# Patient Record
Sex: MALE | Race: WHITE | NOT HISPANIC OR LATINO | Employment: OTHER | URBAN - METROPOLITAN AREA
[De-identification: names, ages, dates, MRNs, and addresses within clinical notes are randomized per-mention and may not be internally consistent; named-entity substitution may affect disease eponyms.]

---

## 2017-01-23 ENCOUNTER — OFFICE VISIT - HEALTHEAST (OUTPATIENT)
Dept: INTERNAL MEDICINE | Facility: CLINIC | Age: 77
End: 2017-01-23

## 2017-01-23 DIAGNOSIS — E66.3 OVERWEIGHT (BMI 25.0-29.9): ICD-10-CM

## 2017-01-23 DIAGNOSIS — E78.00 HYPERCHOLESTEREMIA: ICD-10-CM

## 2017-01-23 DIAGNOSIS — N52.9 ERECTILE DYSFUNCTION: ICD-10-CM

## 2017-01-23 DIAGNOSIS — I10 ESSENTIAL HYPERTENSION WITH GOAL BLOOD PRESSURE LESS THAN 130/80: ICD-10-CM

## 2017-01-24 ENCOUNTER — COMMUNICATION - HEALTHEAST (OUTPATIENT)
Dept: LAB | Facility: CLINIC | Age: 77
End: 2017-01-24

## 2017-01-24 ENCOUNTER — AMBULATORY - HEALTHEAST (OUTPATIENT)
Dept: INTERNAL MEDICINE | Facility: CLINIC | Age: 77
End: 2017-01-24

## 2017-01-24 DIAGNOSIS — N52.9 ED (ERECTILE DYSFUNCTION): ICD-10-CM

## 2017-01-25 ENCOUNTER — AMBULATORY - HEALTHEAST (OUTPATIENT)
Dept: LAB | Facility: CLINIC | Age: 77
End: 2017-01-25

## 2017-01-25 DIAGNOSIS — N52.9 ED (ERECTILE DYSFUNCTION): ICD-10-CM

## 2017-01-29 ENCOUNTER — COMMUNICATION - HEALTHEAST (OUTPATIENT)
Dept: INTERNAL MEDICINE | Facility: CLINIC | Age: 77
End: 2017-01-29

## 2017-02-24 ENCOUNTER — AMBULATORY - HEALTHEAST (OUTPATIENT)
Dept: INTERNAL MEDICINE | Facility: CLINIC | Age: 77
End: 2017-02-24

## 2017-02-24 ENCOUNTER — COMMUNICATION - HEALTHEAST (OUTPATIENT)
Dept: INTERNAL MEDICINE | Facility: CLINIC | Age: 77
End: 2017-02-24

## 2017-05-31 ENCOUNTER — COMMUNICATION - HEALTHEAST (OUTPATIENT)
Dept: INTERNAL MEDICINE | Facility: CLINIC | Age: 77
End: 2017-05-31

## 2017-05-31 DIAGNOSIS — I10 BP (HIGH BLOOD PRESSURE): ICD-10-CM

## 2017-08-29 ENCOUNTER — COMMUNICATION - HEALTHEAST (OUTPATIENT)
Dept: INTERNAL MEDICINE | Facility: CLINIC | Age: 77
End: 2017-08-29

## 2017-08-29 DIAGNOSIS — I10 HYPERTENSION: ICD-10-CM

## 2017-10-18 ENCOUNTER — OFFICE VISIT - HEALTHEAST (OUTPATIENT)
Dept: INTERNAL MEDICINE | Facility: CLINIC | Age: 77
End: 2017-10-18

## 2017-10-18 DIAGNOSIS — E78.5 HYPERLIPIDEMIA: ICD-10-CM

## 2017-10-18 DIAGNOSIS — N52.9 ED (ERECTILE DYSFUNCTION): ICD-10-CM

## 2017-10-18 DIAGNOSIS — Z23 ENCOUNTER FOR VACCINATION: ICD-10-CM

## 2017-10-18 DIAGNOSIS — I10 ESSENTIAL HYPERTENSION: ICD-10-CM

## 2017-10-18 DIAGNOSIS — Z76.89 ENCOUNTER TO ESTABLISH CARE: ICD-10-CM

## 2017-10-18 LAB
CHOLEST SERPL-MCNC: 153 MG/DL
FASTING STATUS PATIENT QL REPORTED: YES
HDLC SERPL-MCNC: 50 MG/DL
LDLC SERPL CALC-MCNC: 80 MG/DL
TRIGL SERPL-MCNC: 114 MG/DL

## 2017-10-18 ASSESSMENT — MIFFLIN-ST. JEOR: SCORE: 1461.98

## 2017-10-20 ENCOUNTER — COMMUNICATION - HEALTHEAST (OUTPATIENT)
Dept: INTERNAL MEDICINE | Facility: CLINIC | Age: 77
End: 2017-10-20

## 2017-11-15 ENCOUNTER — COMMUNICATION - HEALTHEAST (OUTPATIENT)
Dept: INTERNAL MEDICINE | Facility: CLINIC | Age: 77
End: 2017-11-15

## 2017-11-15 DIAGNOSIS — I10 BP (HIGH BLOOD PRESSURE): ICD-10-CM

## 2018-01-10 ENCOUNTER — COMMUNICATION - HEALTHEAST (OUTPATIENT)
Dept: FAMILY MEDICINE | Facility: CLINIC | Age: 78
End: 2018-01-10

## 2018-01-10 ENCOUNTER — AMBULATORY - HEALTHEAST (OUTPATIENT)
Dept: INTERNAL MEDICINE | Facility: CLINIC | Age: 78
End: 2018-01-10

## 2018-05-17 ENCOUNTER — COMMUNICATION - HEALTHEAST (OUTPATIENT)
Dept: FAMILY MEDICINE | Facility: CLINIC | Age: 78
End: 2018-05-17

## 2018-05-28 ENCOUNTER — COMMUNICATION - HEALTHEAST (OUTPATIENT)
Dept: INTERNAL MEDICINE | Facility: CLINIC | Age: 78
End: 2018-05-28

## 2018-05-28 DIAGNOSIS — I10 ESSENTIAL HYPERTENSION: ICD-10-CM

## 2018-06-20 ENCOUNTER — OFFICE VISIT - HEALTHEAST (OUTPATIENT)
Dept: FAMILY MEDICINE | Facility: CLINIC | Age: 78
End: 2018-06-20

## 2018-06-20 DIAGNOSIS — H26.9 CATARACT: ICD-10-CM

## 2018-06-20 DIAGNOSIS — Z01.818 PRE-OP EVALUATION: ICD-10-CM

## 2018-06-20 DIAGNOSIS — I10 ESSENTIAL HYPERTENSION: ICD-10-CM

## 2018-06-20 LAB
ANION GAP SERPL CALCULATED.3IONS-SCNC: 8 MMOL/L (ref 5–18)
ATRIAL RATE - MUSE: 50 BPM
BASOPHILS # BLD AUTO: 0 THOU/UL (ref 0–0.2)
BASOPHILS NFR BLD AUTO: 0 % (ref 0–2)
BUN SERPL-MCNC: 12 MG/DL (ref 8–28)
CALCIUM SERPL-MCNC: 9.8 MG/DL (ref 8.5–10.5)
CHLORIDE BLD-SCNC: 103 MMOL/L (ref 98–107)
CO2 SERPL-SCNC: 27 MMOL/L (ref 22–31)
CREAT SERPL-MCNC: 0.86 MG/DL (ref 0.7–1.3)
DIASTOLIC BLOOD PRESSURE - MUSE: NORMAL MMHG
EOSINOPHIL # BLD AUTO: 0.1 THOU/UL (ref 0–0.4)
EOSINOPHIL NFR BLD AUTO: 1 % (ref 0–6)
ERYTHROCYTE [DISTWIDTH] IN BLOOD BY AUTOMATED COUNT: 10.7 % (ref 11–14.5)
GFR SERPL CREATININE-BSD FRML MDRD: >60 ML/MIN/1.73M2
GLUCOSE BLD-MCNC: 94 MG/DL (ref 70–125)
HCT VFR BLD AUTO: 45.5 % (ref 40–54)
HGB BLD-MCNC: 15.4 G/DL (ref 14–18)
INTERPRETATION ECG - MUSE: NORMAL
LYMPHOCYTES # BLD AUTO: 1.8 THOU/UL (ref 0.8–4.4)
LYMPHOCYTES NFR BLD AUTO: 31 % (ref 20–40)
MCH RBC QN AUTO: 30.9 PG (ref 27–34)
MCHC RBC AUTO-ENTMCNC: 33.8 G/DL (ref 32–36)
MCV RBC AUTO: 91 FL (ref 80–100)
MONOCYTES # BLD AUTO: 0.6 THOU/UL (ref 0–0.9)
MONOCYTES NFR BLD AUTO: 10 % (ref 2–10)
NEUTROPHILS # BLD AUTO: 3.3 THOU/UL (ref 2–7.7)
NEUTROPHILS NFR BLD AUTO: 58 % (ref 50–70)
P AXIS - MUSE: 18 DEGREES
PLATELET # BLD AUTO: 219 THOU/UL (ref 140–440)
PMV BLD AUTO: 6.9 FL (ref 7–10)
POTASSIUM BLD-SCNC: 4.5 MMOL/L (ref 3.5–5)
PR INTERVAL - MUSE: 164 MS
QRS DURATION - MUSE: 102 MS
QT - MUSE: 450 MS
QTC - MUSE: 410 MS
R AXIS - MUSE: 20 DEGREES
RBC # BLD AUTO: 4.98 MILL/UL (ref 4.4–6.2)
SODIUM SERPL-SCNC: 138 MMOL/L (ref 136–145)
SYSTOLIC BLOOD PRESSURE - MUSE: NORMAL MMHG
T AXIS - MUSE: 29 DEGREES
VENTRICULAR RATE- MUSE: 50 BPM
WBC: 5.7 THOU/UL (ref 4–11)

## 2018-06-21 ENCOUNTER — COMMUNICATION - HEALTHEAST (OUTPATIENT)
Dept: INTERNAL MEDICINE | Facility: CLINIC | Age: 78
End: 2018-06-21

## 2018-08-28 ENCOUNTER — COMMUNICATION - HEALTHEAST (OUTPATIENT)
Dept: INTERNAL MEDICINE | Facility: CLINIC | Age: 78
End: 2018-08-28

## 2018-08-28 DIAGNOSIS — I10 ESSENTIAL HYPERTENSION: ICD-10-CM

## 2018-10-07 ENCOUNTER — COMMUNICATION - HEALTHEAST (OUTPATIENT)
Dept: INTERNAL MEDICINE | Facility: CLINIC | Age: 78
End: 2018-10-07

## 2018-10-07 DIAGNOSIS — E78.5 HYPERLIPIDEMIA: ICD-10-CM

## 2018-10-10 ENCOUNTER — COMMUNICATION - HEALTHEAST (OUTPATIENT)
Dept: INTERNAL MEDICINE | Facility: CLINIC | Age: 78
End: 2018-10-10

## 2018-10-10 DIAGNOSIS — E78.5 HYPERLIPIDEMIA: ICD-10-CM

## 2018-10-23 ENCOUNTER — AMBULATORY - HEALTHEAST (OUTPATIENT)
Dept: NURSING | Facility: CLINIC | Age: 78
End: 2018-10-23

## 2018-10-29 ENCOUNTER — COMMUNICATION - HEALTHEAST (OUTPATIENT)
Dept: INTERNAL MEDICINE | Facility: CLINIC | Age: 78
End: 2018-10-29

## 2018-10-29 DIAGNOSIS — E78.5 HYPERLIPIDEMIA: ICD-10-CM

## 2018-11-23 ENCOUNTER — COMMUNICATION - HEALTHEAST (OUTPATIENT)
Dept: FAMILY MEDICINE | Facility: CLINIC | Age: 78
End: 2018-11-23

## 2018-11-23 DIAGNOSIS — I10 ESSENTIAL HYPERTENSION: ICD-10-CM

## 2018-11-28 ENCOUNTER — COMMUNICATION - HEALTHEAST (OUTPATIENT)
Dept: FAMILY MEDICINE | Facility: CLINIC | Age: 78
End: 2018-11-28

## 2018-11-28 DIAGNOSIS — I10 ESSENTIAL HYPERTENSION: ICD-10-CM

## 2019-03-06 ENCOUNTER — COMMUNICATION - HEALTHEAST (OUTPATIENT)
Dept: SCHEDULING | Facility: CLINIC | Age: 79
End: 2019-03-06

## 2019-03-07 ENCOUNTER — COMMUNICATION - HEALTHEAST (OUTPATIENT)
Dept: FAMILY MEDICINE | Facility: CLINIC | Age: 79
End: 2019-03-07

## 2019-03-07 DIAGNOSIS — I10 ESSENTIAL HYPERTENSION: ICD-10-CM

## 2019-03-25 ENCOUNTER — COMMUNICATION - HEALTHEAST (OUTPATIENT)
Dept: FAMILY MEDICINE | Facility: CLINIC | Age: 79
End: 2019-03-25

## 2019-03-28 ENCOUNTER — OFFICE VISIT - HEALTHEAST (OUTPATIENT)
Dept: FAMILY MEDICINE | Facility: CLINIC | Age: 79
End: 2019-03-28

## 2019-03-28 DIAGNOSIS — N52.9 ED (ERECTILE DYSFUNCTION): ICD-10-CM

## 2019-03-29 ENCOUNTER — NURSE TRIAGE (OUTPATIENT)
Dept: NURSING | Facility: CLINIC | Age: 79
End: 2019-03-29

## 2019-03-29 ENCOUNTER — COMMUNICATION - HEALTHEAST (OUTPATIENT)
Dept: FAMILY MEDICINE | Facility: CLINIC | Age: 79
End: 2019-03-29

## 2019-04-29 ENCOUNTER — RECORDS - HEALTHEAST (OUTPATIENT)
Dept: ADMINISTRATIVE | Facility: OTHER | Age: 79
End: 2019-04-29

## 2019-06-06 ENCOUNTER — COMMUNICATION - HEALTHEAST (OUTPATIENT)
Dept: FAMILY MEDICINE | Facility: CLINIC | Age: 79
End: 2019-06-06

## 2019-06-06 DIAGNOSIS — I10 ESSENTIAL HYPERTENSION: ICD-10-CM

## 2019-07-01 ENCOUNTER — OFFICE VISIT - HEALTHEAST (OUTPATIENT)
Dept: FAMILY MEDICINE | Facility: CLINIC | Age: 79
End: 2019-07-01

## 2019-07-01 DIAGNOSIS — Z00.00 ROUTINE GENERAL MEDICAL EXAMINATION AT A HEALTH CARE FACILITY: ICD-10-CM

## 2019-07-01 DIAGNOSIS — Z98.890 H/O COLONOSCOPY: ICD-10-CM

## 2019-07-01 DIAGNOSIS — I10 ESSENTIAL HYPERTENSION: ICD-10-CM

## 2019-07-01 DIAGNOSIS — E78.00 HYPERCHOLESTEREMIA: ICD-10-CM

## 2019-07-01 LAB
ALBUMIN SERPL-MCNC: 4.2 G/DL (ref 3.5–5)
ALP SERPL-CCNC: 64 U/L (ref 45–120)
ALT SERPL W P-5'-P-CCNC: 32 U/L (ref 0–45)
ANION GAP SERPL CALCULATED.3IONS-SCNC: 11 MMOL/L (ref 5–18)
AST SERPL W P-5'-P-CCNC: 26 U/L (ref 0–40)
BILIRUB SERPL-MCNC: 0.8 MG/DL (ref 0–1)
BUN SERPL-MCNC: 15 MG/DL (ref 8–28)
CALCIUM SERPL-MCNC: 9.5 MG/DL (ref 8.5–10.5)
CHLORIDE BLD-SCNC: 101 MMOL/L (ref 98–107)
CHOLEST SERPL-MCNC: 193 MG/DL
CO2 SERPL-SCNC: 26 MMOL/L (ref 22–31)
CREAT SERPL-MCNC: 0.99 MG/DL (ref 0.7–1.3)
FASTING STATUS PATIENT QL REPORTED: YES
GFR SERPL CREATININE-BSD FRML MDRD: >60 ML/MIN/1.73M2
GLUCOSE BLD-MCNC: 104 MG/DL (ref 70–125)
HDLC SERPL-MCNC: 63 MG/DL
LDLC SERPL CALC-MCNC: 87 MG/DL
POTASSIUM BLD-SCNC: 4.2 MMOL/L (ref 3.5–5)
PROT SERPL-MCNC: 7.1 G/DL (ref 6–8)
SODIUM SERPL-SCNC: 138 MMOL/L (ref 136–145)
TRIGL SERPL-MCNC: 217 MG/DL

## 2019-07-01 ASSESSMENT — MIFFLIN-ST. JEOR: SCORE: 1491.44

## 2019-07-03 ENCOUNTER — COMMUNICATION - HEALTHEAST (OUTPATIENT)
Dept: INTERNAL MEDICINE | Facility: CLINIC | Age: 79
End: 2019-07-03

## 2019-07-18 ENCOUNTER — RECORDS - HEALTHEAST (OUTPATIENT)
Dept: ADMINISTRATIVE | Facility: OTHER | Age: 79
End: 2019-07-18

## 2019-07-26 ENCOUNTER — COMMUNICATION - HEALTHEAST (OUTPATIENT)
Dept: FAMILY MEDICINE | Facility: CLINIC | Age: 79
End: 2019-07-26

## 2019-07-26 DIAGNOSIS — I10 ESSENTIAL HYPERTENSION: ICD-10-CM

## 2019-07-26 DIAGNOSIS — E78.5 HYPERLIPIDEMIA: ICD-10-CM

## 2019-08-07 ENCOUNTER — AMBULATORY - HEALTHEAST (OUTPATIENT)
Dept: NURSING | Facility: CLINIC | Age: 79
End: 2019-08-07

## 2019-08-07 DIAGNOSIS — Z00.00 ROUTINE GENERAL MEDICAL EXAMINATION AT A HEALTH CARE FACILITY: ICD-10-CM

## 2019-09-02 ENCOUNTER — COMMUNICATION - HEALTHEAST (OUTPATIENT)
Dept: FAMILY MEDICINE | Facility: CLINIC | Age: 79
End: 2019-09-02

## 2019-09-02 DIAGNOSIS — I10 ESSENTIAL HYPERTENSION: ICD-10-CM

## 2019-09-04 ENCOUNTER — RECORDS - HEALTHEAST (OUTPATIENT)
Dept: ADMINISTRATIVE | Facility: OTHER | Age: 79
End: 2019-09-04

## 2019-09-06 ENCOUNTER — COMMUNICATION - HEALTHEAST (OUTPATIENT)
Dept: FAMILY MEDICINE | Facility: CLINIC | Age: 79
End: 2019-09-06

## 2019-09-06 DIAGNOSIS — I10 ESSENTIAL HYPERTENSION: ICD-10-CM

## 2020-02-26 ENCOUNTER — COMMUNICATION - HEALTHEAST (OUTPATIENT)
Dept: FAMILY MEDICINE | Facility: CLINIC | Age: 80
End: 2020-02-26

## 2020-02-26 DIAGNOSIS — I10 ESSENTIAL HYPERTENSION: ICD-10-CM

## 2020-07-21 ENCOUNTER — COMMUNICATION - HEALTHEAST (OUTPATIENT)
Dept: FAMILY MEDICINE | Facility: CLINIC | Age: 80
End: 2020-07-21

## 2020-07-21 DIAGNOSIS — E78.5 HYPERLIPIDEMIA: ICD-10-CM

## 2020-08-04 ENCOUNTER — COMMUNICATION - HEALTHEAST (OUTPATIENT)
Dept: SCHEDULING | Facility: CLINIC | Age: 80
End: 2020-08-04

## 2020-08-04 DIAGNOSIS — E78.5 HYPERLIPIDEMIA: ICD-10-CM

## 2020-08-05 ENCOUNTER — RECORDS - HEALTHEAST (OUTPATIENT)
Dept: ADMINISTRATIVE | Facility: OTHER | Age: 80
End: 2020-08-05

## 2020-08-07 ENCOUNTER — OFFICE VISIT - HEALTHEAST (OUTPATIENT)
Dept: FAMILY MEDICINE | Facility: CLINIC | Age: 80
End: 2020-08-07

## 2020-08-07 DIAGNOSIS — E55.9 VITAMIN D DEFICIENCY: ICD-10-CM

## 2020-08-07 DIAGNOSIS — N52.9 ERECTILE DYSFUNCTION, UNSPECIFIED ERECTILE DYSFUNCTION TYPE: ICD-10-CM

## 2020-08-07 DIAGNOSIS — Z00.00 ROUTINE GENERAL MEDICAL EXAMINATION AT A HEALTH CARE FACILITY: ICD-10-CM

## 2020-08-07 DIAGNOSIS — E78.00 HYPERCHOLESTEREMIA: ICD-10-CM

## 2020-08-07 DIAGNOSIS — I10 ESSENTIAL HYPERTENSION: ICD-10-CM

## 2020-08-07 LAB
ALBUMIN SERPL-MCNC: 4.1 G/DL (ref 3.5–5)
ALP SERPL-CCNC: 55 U/L (ref 45–120)
ALT SERPL W P-5'-P-CCNC: 26 U/L (ref 0–45)
ANION GAP SERPL CALCULATED.3IONS-SCNC: 11 MMOL/L (ref 5–18)
AST SERPL W P-5'-P-CCNC: 21 U/L (ref 0–40)
BILIRUB SERPL-MCNC: 0.6 MG/DL (ref 0–1)
BUN SERPL-MCNC: 13 MG/DL (ref 8–28)
CALCIUM SERPL-MCNC: 9.5 MG/DL (ref 8.5–10.5)
CHLORIDE BLD-SCNC: 102 MMOL/L (ref 98–107)
CHOLEST SERPL-MCNC: 176 MG/DL
CO2 SERPL-SCNC: 25 MMOL/L (ref 22–31)
CREAT SERPL-MCNC: 0.93 MG/DL (ref 0.7–1.3)
FASTING STATUS PATIENT QL REPORTED: YES
GFR SERPL CREATININE-BSD FRML MDRD: >60 ML/MIN/1.73M2
GLUCOSE BLD-MCNC: 95 MG/DL (ref 70–125)
HDLC SERPL-MCNC: 60 MG/DL
LDLC SERPL CALC-MCNC: 93 MG/DL
POTASSIUM BLD-SCNC: 4.8 MMOL/L (ref 3.5–5)
PROT SERPL-MCNC: 6.8 G/DL (ref 6–8)
SODIUM SERPL-SCNC: 138 MMOL/L (ref 136–145)
TRIGL SERPL-MCNC: 117 MG/DL

## 2020-08-07 ASSESSMENT — MIFFLIN-ST. JEOR: SCORE: 1455.86

## 2020-08-10 LAB
25(OH)D3 SERPL-MCNC: 33.4 NG/ML (ref 30–80)
25(OH)D3 SERPL-MCNC: 33.4 NG/ML (ref 30–80)

## 2020-08-11 ENCOUNTER — COMMUNICATION - HEALTHEAST (OUTPATIENT)
Dept: FAMILY MEDICINE | Facility: CLINIC | Age: 80
End: 2020-08-11

## 2020-08-13 ENCOUNTER — COMMUNICATION - HEALTHEAST (OUTPATIENT)
Dept: FAMILY MEDICINE | Facility: CLINIC | Age: 80
End: 2020-08-13

## 2020-08-13 DIAGNOSIS — I10 ESSENTIAL HYPERTENSION: ICD-10-CM

## 2020-08-13 RX ORDER — AMLODIPINE BESYLATE 5 MG/1
5 TABLET ORAL DAILY
Qty: 90 TABLET | Refills: 3 | Status: SHIPPED | OUTPATIENT
Start: 2020-08-13 | End: 2021-08-22

## 2020-08-17 ENCOUNTER — COMMUNICATION - HEALTHEAST (OUTPATIENT)
Dept: FAMILY MEDICINE | Facility: CLINIC | Age: 80
End: 2020-08-17

## 2020-08-17 DIAGNOSIS — N52.9 ED (ERECTILE DYSFUNCTION): ICD-10-CM

## 2020-08-18 RX ORDER — SILDENAFIL CITRATE 20 MG/1
60 TABLET ORAL DAILY PRN
Qty: 50 TABLET | Refills: 5 | Status: SHIPPED | OUTPATIENT
Start: 2020-08-18 | End: 2021-08-23

## 2020-10-23 ENCOUNTER — COMMUNICATION - HEALTHEAST (OUTPATIENT)
Dept: FAMILY MEDICINE | Facility: CLINIC | Age: 80
End: 2020-10-23

## 2020-10-23 DIAGNOSIS — E78.5 HYPERLIPIDEMIA: ICD-10-CM

## 2020-10-25 RX ORDER — ATORVASTATIN CALCIUM 20 MG/1
20 TABLET, FILM COATED ORAL DAILY
Qty: 90 TABLET | Refills: 2 | Status: SHIPPED | OUTPATIENT
Start: 2020-10-25 | End: 2021-08-23

## 2020-12-01 ENCOUNTER — COMMUNICATION - HEALTHEAST (OUTPATIENT)
Dept: FAMILY MEDICINE | Facility: CLINIC | Age: 80
End: 2020-12-01

## 2020-12-01 DIAGNOSIS — I10 ESSENTIAL HYPERTENSION: ICD-10-CM

## 2020-12-01 RX ORDER — LISINOPRIL/HYDROCHLOROTHIAZIDE 10-12.5 MG
1 TABLET ORAL DAILY
Qty: 90 TABLET | Refills: 2 | Status: SHIPPED | OUTPATIENT
Start: 2020-12-01 | End: 2021-08-23

## 2020-12-11 ENCOUNTER — COMMUNICATION - HEALTHEAST (OUTPATIENT)
Dept: INTERNAL MEDICINE | Facility: CLINIC | Age: 80
End: 2020-12-11

## 2020-12-11 DIAGNOSIS — I10 ESSENTIAL HYPERTENSION: ICD-10-CM

## 2021-05-03 ENCOUNTER — OFFICE VISIT - HEALTHEAST (OUTPATIENT)
Dept: FAMILY MEDICINE | Facility: CLINIC | Age: 81
End: 2021-05-03

## 2021-05-03 DIAGNOSIS — R39.14 BENIGN PROSTATIC HYPERPLASIA WITH INCOMPLETE BLADDER EMPTYING: ICD-10-CM

## 2021-05-03 DIAGNOSIS — E78.00 HYPERCHOLESTEREMIA: ICD-10-CM

## 2021-05-03 DIAGNOSIS — I10 ESSENTIAL HYPERTENSION: ICD-10-CM

## 2021-05-03 DIAGNOSIS — Z76.89 ENCOUNTER TO ESTABLISH CARE: ICD-10-CM

## 2021-05-03 DIAGNOSIS — N52.9 ERECTILE DYSFUNCTION, UNSPECIFIED ERECTILE DYSFUNCTION TYPE: ICD-10-CM

## 2021-05-03 DIAGNOSIS — N40.1 BENIGN PROSTATIC HYPERPLASIA WITH INCOMPLETE BLADDER EMPTYING: ICD-10-CM

## 2021-05-03 RX ORDER — TAMSULOSIN HYDROCHLORIDE 0.4 MG/1
0.4 CAPSULE ORAL
Qty: 90 CAPSULE | Refills: 3 | Status: SHIPPED | OUTPATIENT
Start: 2021-05-03 | End: 2021-09-17 | Stop reason: SINTOL

## 2021-05-03 ASSESSMENT — MIFFLIN-ST. JEOR: SCORE: 1462.09

## 2021-05-03 NOTE — ASSESSMENT & PLAN NOTE
Discussed the idea of possibly coming off of her atorvastatin.  Patient has been on for primary prevention and not secondary prevention.  Is tolerating the medication well without side effects and thus decided to stay on medication at this time.

## 2021-05-03 NOTE — ASSESSMENT & PLAN NOTE
Will start tamsulosin at 0.4 mg daily.  Expect improvement of either nocturia, bladder emptying, or erections by the end of 1 month ofuse.  If not improved then will send to urology for further evaluation and treatment.

## 2021-05-03 NOTE — ASSESSMENT & PLAN NOTE
Well-controlled with occasional symptoms of hypotension.  Additionally with start of Flomax will stop amlodipine.  If further blood pressure control needed in future will look at adjusting lisinopril or hydrochlorothiazide.

## 2021-05-03 NOTE — ASSESSMENT & PLAN NOTE
Previously responded well to sildenafil 100 mg as needed.  Given the decrease in effectiveness over the past year as well as the BPH symptoms most likely reduced efficacy from BPH and thus will treat BPH before adjusting sildenafil.

## 2021-05-27 VITALS
TEMPERATURE: 98.1 F | RESPIRATION RATE: 12 BRPM | HEART RATE: 60 BPM | WEIGHT: 178.2 LBS | DIASTOLIC BLOOD PRESSURE: 64 MMHG | SYSTOLIC BLOOD PRESSURE: 130 MMHG | BODY MASS INDEX: 28.64 KG/M2 | HEIGHT: 66 IN

## 2021-05-27 NOTE — TELEPHONE ENCOUNTER
Medication Question or Clarification  Who is calling: Patient  What medication are you calling about? (include dose and sig) Sildenafil 20mg  Who prescribed the medication?: Dr. Ward  What is your question/concern?: See Below  Pharmacy: [facility] in [place]  Okay to leave a detailed message?: Yes  Site CMT - Please call the pharmacy to obtain any additional needed information.    Patient states that PCP sent over a Rx for Viagra but told him to call today and request that a Rx be sent to the Pharmacy for the generic Sildenafil. Patient wants a Rx for Sildenafil 20mg and for a quantity of 50.

## 2021-05-27 NOTE — PROGRESS NOTES
Assessment/Plan:        1. ED (erectile dysfunction)  tx options were reviewed and the cost was also considered   Plan:   - sildenafil, antihypertensive, (REVATIO) 20 mg tablet; Take 1-3 tablets (20-60 mg total) by mouth daily as needed.  Dispense: 50 tablet; Refill: 3        Follow up prn       Subjective:    Patient ID:   Go Benton is a 78 y.o. male here to discuss treatment options for ED.  He has tried the Viagra before, but wondering any other options would be as or even more effective.     He has no other concerns today.        Review of Systems  A complete 5 point review of systems was obtained and is negative other than what is stated in the HPI.      The following patient's history were reviewed and updated as appropriate:   He  has a past medical history of Cataract (6/20/2018) and Hypertension..      Outpatient Encounter Medications as of 3/28/2019   Medication Sig Dispense Refill     amLODIPine (NORVASC) 5 MG tablet Take 1 tablet (5 mg total) by mouth daily. 90 tablet 1     aspirin 81 MG EC tablet Take 81 mg by mouth daily.       atorvastatin (LIPITOR) 20 MG tablet Take 1 tablet (20 mg total) by mouth daily. 90 tablet 2     cholecalciferol, vitamin D3, 1,000 unit tablet Take 1,000 Units by mouth daily.       lisinopril-hydrochlorothiazide (PRINZIDE,ZESTORETIC) 10-12.5 mg per tablet Take 1 tablet by mouth daily for blood pressure. 90 tablet 2     sildenafil (VIAGRA) 100 MG tablet Take 1 tablet (100 mg total) by mouth daily as needed for erectile dysfunction. 10 tablet 3     [DISCONTINUED] sildenafil (VIAGRA) 100 MG tablet Take 1 tablet (100 mg total) by mouth daily as needed for erectile dysfunction. 30 tablet 1     No facility-administered encounter medications on file as of 3/28/2019.          Objective:   /72 (Patient Site: Right Arm, Patient Position: Sitting, Cuff Size: Adult Regular)   Pulse 60   Wt 185 lb 3.2 oz (84 kg)   SpO2 95%   BMI 29.89 kg/m        Physical Exam  General  Appearance:    Alert, well hydrated, no distress   Throat:   mucous membranes moist, pharynx normal without lesions   Neck:   Supple, symmetrical, trachea midline, no adenopathy;     thyroid:  no enlargement/tenderness/nodules;    Lungs:     clear to auscultation, no wheezes, rales or rhonchi, symmetric air entry     Heart:    Regular rate and rhythm, S1 and S2 normal, no murmur, rub   or gallop, no edema    Skin:   Skin color, texture, turgor normal, no rashes or lesions

## 2021-05-27 NOTE — TELEPHONE ENCOUNTER
sildenafil (VIAGRA) 100 MG tablet 30 tablet 1 10/18/2017  --   Sig - Route: Take 1 tablet (100 mg total) by mouth daily as needed for erectile dysfunction. - Oral   Class: Print       Last OV 06/20/18    Nothing scheduled at this time      Sent [facility] reply - pt needs appt.

## 2021-05-29 NOTE — TELEPHONE ENCOUNTER
Refill Approved    Rx renewed per Medication Renewal Policy. Medication was last renewed on 11/26/18.    Ov: 3/28/19    Rosi Self, [facility] Triage/Med Refill 6/6/2019     Requested Prescriptions   Pending Prescriptions Disp Refills     lisinopril-hydrochlorothiazide (PRINZIDE,ZESTORETIC) 10-12.5 mg per tablet 90 tablet 2     Sig: Take 1 tablet by mouth daily. for blood pressure       Diuretics/Combination Diuretics Refill Protocol  Passed - 6/6/2019  1:03 PM        Passed - Visit with PCP or prescribing provider visit in past 12 months     Last office visit with prescriber/PCP: 3/28/2019 Matteo Ward MD OR same dept: 3/28/2019 Matteo Ward MD OR same specialty: 3/28/2019 Matteo Ward MD  Last physical: 6/20/2018 Last MTM visit: Visit date not found   Next visit within 3 mo: Visit date not found  Next physical within 3 mo: Visit date not found  Prescriber OR PCP: Matteo Ward MD  Last diagnosis associated with med order: 1. Essential hypertension  - lisinopril-hydrochlorothiazide (PRINZIDE,ZESTORETIC) 10-12.5 mg per tablet; Take 1 tablet by mouth daily. for blood pressure  Dispense: 90 tablet; Refill: 2    If protocol passes may refill for 12 months if within 3 months of last provider visit (or a total of 15 months).             Passed - Serum Potassium in past 12 months      Lab Results   Component Value Date    Potassium 4.5 06/20/2018             Passed - Serum Sodium in past 12 months      Lab Results   Component Value Date    Sodium 138 06/20/2018             Passed - Blood pressure on file in past 12 months     BP Readings from Last 1 Encounters:   03/28/19 134/72             Passed - Serum Creatinine in past 12 months      Creatinine   Date Value Ref Range Status   06/20/2018 0.86 0.70 - 1.30 mg/dL Final

## 2021-05-29 NOTE — TELEPHONE ENCOUNTER
Refill Request  Did you contact pharmacy: Yes  Medication name:   Requested Prescriptions     Pending Prescriptions Disp Refills     lisinopril-hydrochlorothiazide (PRINZIDE,ZESTORETIC) 10-12.5 mg per tablet 90 tablet 2     Sig: Take 1 tablet by mouth daily. for blood pressure     Who prescribed the medication: Matteo Ward MD   Pharmacy Name and Location: [facility] [place]  Is patient out of medication: Yes  Patient notified refills processed in 72 hours:  no  Okay to leave a detailed message: no    Patient's wife stated the Rx was transferred to [place] but they are in [place]. Caller stated she needs a new Rx sent in as patient is out.

## 2021-05-30 VITALS — BODY MASS INDEX: 29.73 KG/M2 | WEIGHT: 184.2 LBS

## 2021-05-30 NOTE — TELEPHONE ENCOUNTER
Refill Approved    Rx renewed per Medication Renewal Policy. Medication was last renewed on 10/29/18.    Salina Collins, [facility] Triage/Med Refill 7/26/2019     Requested Prescriptions   Pending Prescriptions Disp Refills     atorvastatin (LIPITOR) 20 MG tablet 90 tablet 2     Sig: Take 1 tablet (20 mg total) by mouth daily.       Statins Refill Protocol (Hmg CoA Reductase Inhibitors) Passed - 7/26/2019  8:46 AM        Passed - PCP or prescribing provider visit in past 12 months      Last office visit with prescriber/PCP: 3/28/2019 Matteo Ward MD OR same dept: 3/28/2019 Matteo Ward MD OR same specialty: 3/28/2019 Matteo Ward MD  Last physical: 7/1/2019 Last MTM visit: Visit date not found   Next visit within 3 mo: Visit date not found  Next physical within 3 mo: Visit date not found  Prescriber OR PCP: Matteo Ward MD  Last diagnosis associated with med order: 1. Hyperlipidemia  - atorvastatin (LIPITOR) 20 MG tablet; Take 1 tablet (20 mg total) by mouth daily.  Dispense: 90 tablet; Refill: 2    2. Essential hypertension    If protocol passes may refill for 12 months if within 3 months of last provider visit (or a total of 15 months).

## 2021-05-30 NOTE — PROGRESS NOTES
Assessment and Plan:     1. Routine general medical examination at a health care facility  - Pneumococcal polysaccharide vaccine 23-valent 1 yo or older, subq/IM  - Varicella Zoster, Recombinant Vaccine IM; Standing  - Td, Preservative Free (green label)    2. H/O colonoscopy- nl     3. BP - high blood pressure  Optimized   Continue current management     4. Hypercholesteremia  - Comprehensive Metabolic Panel  - Lipid Profile     The patient's current medical problems were reviewed.  No refills needed today.         The following health maintenance schedule was reviewed with the patient and provided in printed form in the after visit summary:   Health Maintenance   Topic Date Due     TD 18+ HE  06/06/1958     ADVANCE DIRECTIVES DISCUSSED WITH PATIENT  [birth year]06/1958     ZOSTER VACCINES (1 of 2) 06/06/1991     PNEUMOCOCCAL POLYSACCHARIDE VACCINE AGE 65 AND OVER  06/06/2006     FALL RISK ASSESSMENT  10/18/2018     INFLUENZA VACCINE RULE BASED (1) 08/01/2019     PNEUMOCOCCAL CONJUGATE VACCINE FOR ADULTS (PCV13 OR PREVNAR)  Completed        Subjective:   Chief Complaint: Go Benton is an 79 y.o. male here for an Annual Wellness visit.   He has no other concerns.     Review of Systems  Allergy: reviewed  General : negative  Ophthalmic : negative  ENT : negative  Respiratory : no cough, shortness of breath, or wheezing  Cardiovascular : no chest pain or dyspnea on exertion  Gastrointestinal : no abdominal pain, change in bowel habits, or black or bloody stools  Genito-Urinary :  no dysuria, trouble voiding, or hematuria  Dermatological : negative    Musculoskeletal : negative  Neurological : negative  Hematological and Lymphatic : negative  Endocrine : negative        Patient Care Team:  Matteo Ward MD as PCP - General (Family Medicine)     Patient Active Problem List   Diagnosis     Hypercholesteremia     BP - high blood pressure     Hypovitaminosis D - Vitamin D is almost certainly low due to how far  [place] you live.     Overweight (BMI 25.0-29.9)     ED (erectile dysfunction)     Cataract     Past Medical History:   Diagnosis Date     Cataract 2018     Hypertension       Past Surgical History:   Procedure Laterality Date     APPENDECTOMY        Family History   Problem Relation Age of Onset     Heart disease Mother          of a heart attack.     Diabetes Mother      Heart disease Father         Found dead - therefore likely heart.     Multiple sclerosis Sister      No Medical Problems Daughter      Cancer Sister         Primary brain tumor     No Medical Problems Sister      No Medical Problems Daughter       Social History     Socioeconomic History     Marital status:      Spouse name: Not on file     Number of children: Not on file     Years of education: Not on file     Highest education level: Not on file   Occupational History     Not on file   Social Needs     Financial resource strain: Not on file     Food insecurity:     Worry: Not on file     Inability: Not on file     Transportation needs:     Medical: Not on file     Non-medical: Not on file   Tobacco Use     Smoking status: Never Smoker     Smokeless tobacco: Never Used   Substance and Sexual Activity     Alcohol use: Yes     Alcohol/week: 8.4 oz     Types: 14 Glasses of wine per week     Drug use: No     Sexual activity: Yes     Partners: Female   Lifestyle     Physical activity:     Days per week: Not on file     Minutes per session: Not on file     Stress: Not on file   Relationships     Social connections:     Talks on phone: Not on file     Gets together: Not on file     Attends Jewish service: Not on file     Active member of club or organization: Not on file     Attends meetings of clubs or organizations: Not on file     Relationship status: Not on file     Intimate partner violence:     Fear of current or ex partner: Not on file     Emotionally abused: Not on file     Physically abused: Not on file     Forced sexual  "activity: Not on file   Other Topics Concern     Not on file   Social History Narrative     Not on file      Current Outpatient Medications   Medication Sig Dispense Refill     amLODIPine (NORVASC) 5 MG tablet Take 1 tablet (5 mg total) by mouth daily. 90 tablet 1     aspirin 81 MG EC tablet Take 81 mg by mouth daily.       atorvastatin (LIPITOR) 20 MG tablet Take 1 tablet (20 mg total) by mouth daily. 90 tablet 2     cholecalciferol, vitamin D3, 1,000 unit tablet Take 1,000 Units by mouth daily.       lisinopril-hydrochlorothiazide (PRINZIDE,ZESTORETIC) 10-12.5 mg per tablet Take 1 tablet by mouth daily. for blood pressure 90 tablet 0     sildenafil, antihypertensive, (REVATIO) 20 mg tablet Take 1-3 tablets (20-60 mg total) by mouth daily as needed. 50 tablet 3     No current facility-administered medications for this visit.       Objective:   Vital Signs:   Visit Vitals  /80 (Patient Site: Right Arm, Patient Position: Sitting)   Pulse (!) 54   Ht 5' 6.14\" (1.68 m)   Wt 183 lb 4.8 oz (83.1 kg)   SpO2 96%   BMI 29.46 kg/m         VisionScreening:  No exam data present     PHYSICAL EXAM  General Appearance:    Alert, well hydrated, no distress,    Eyes:    PERRL, conjunctiva/corneas clear,    Throat:   Lips, mucosa, and tongue normal; teeth and gums normal   Neck:   Supple, symmetrical, trachea midline, no adenopathy;        thyroid:  No enlargement/tenderness/nodules; no carotid    bruit or JVD   Lungs:     Clear to auscultation bilaterally, respirations unlabored   Heart:    Regular rate and rhythm, S1 and S2 normal, no murmur, rub   or gallop   Abdomen:     Soft, non-tender, normal bowel sounds, no rebound or guarding, no masses, no organomegaly   Extremities:   Extremities normal, atraumatic, no cyanosis or edema   Skin:   Skin color, texture, turgor normal, no rashes or lesions        No flowsheet data found.  A Mini-Cog score of 0-2 suggests the possibility of dementia, score of 3-5 suggests no " dementia    Identified Health Risks:

## 2021-05-30 NOTE — PATIENT INSTRUCTIONS - HE
Patient Education   Personalized Prevention Plan  You are due for the preventive services outlined below.  Your care team is available to assist you in scheduling these services.  If you have already completed any of these items, please share that information with your care team to update in your medical record.  Health Maintenance   Topic Date Due     TD 18+ HE  06/06/1958     ADVANCE DIRECTIVES DISCUSSED WITH PATIENT  06/06/1958     ZOSTER VACCINES (1 of 2) 06/06/1991     PNEUMOCOCCAL POLYSACCHARIDE VACCINE AGE 65 AND OVER  06/06/2006     FALL RISK ASSESSMENT  10/18/2018     INFLUENZA VACCINE RULE BASED (1) 08/01/2019     PNEUMOCOCCAL CONJUGATE VACCINE FOR ADULTS (PCV13 OR PREVNAR)  Completed         1. Routine general medical examination at a health care facility  - Pneumococcal polysaccharide vaccine 23-valent 3 yo or older, subq/IM  - Varicella Zoster, Recombinant Vaccine IM; Standing  Follow up in a month for the second shot.     - Td, Preservative Free (green label)    2. H/O colonoscopy- nl     3. BP - high blood pressure  - Lipid Profile    4. Hypercholesteremia  - Comprehensive Metabolic Panel  - Lipid Profile

## 2021-05-31 VITALS — HEIGHT: 66 IN | WEIGHT: 177.3 LBS | BODY MASS INDEX: 28.49 KG/M2

## 2021-05-31 NOTE — TELEPHONE ENCOUNTER
Refill Approved    Rx renewed per Medication Renewal Policy. Medication was last renewed on 3/7/19.    Sulma Arteaga, [facility] Triage/Med Refill 9/2/2019     Requested Prescriptions   Pending Prescriptions Disp Refills     amLODIPine (NORVASC) 5 MG tablet 90 tablet 1     Sig: Take 1 tablet (5 mg total) by mouth daily.       Calcium-Channel Blockers Protocol Passed - 9/2/2019  2:01 PM        Passed - PCP or prescribing provider visit in past 12 months or next 3 months     Last office visit with prescriber/PCP: 3/28/2019 Matteo Ward MD OR same dept: 3/28/2019 Matteo Ward MD OR same specialty: 3/28/2019 Matteo Ward MD  Last physical: 7/1/2019 Last MTM visit: Visit date not found   Next visit within 3 mo: Visit date not found  Next physical within 3 mo: Visit date not found  Prescriber OR PCP: Matteo Ward MD  Last diagnosis associated with med order: 1. Essential hypertension  - amLODIPine (NORVASC) 5 MG tablet; Take 1 tablet (5 mg total) by mouth daily.  Dispense: 90 tablet; Refill: 1    If protocol passes may refill for 12 months if within 3 months of last provider visit (or a total of 15 months).             Passed - Blood pressure filed in past 12 months     BP Readings from Last 1 Encounters:   07/01/19 110/80

## 2021-06-01 VITALS — BODY MASS INDEX: 29.02 KG/M2 | WEIGHT: 179.8 LBS

## 2021-06-01 NOTE — TELEPHONE ENCOUNTER
Refill Approved    Rx renewed per Medication Renewal Policy. Medication was last renewed on 6/6/19.    Viki Ricketts, [facility] Triage/Med Refill 9/7/2019     Requested Prescriptions   Pending Prescriptions Disp Refills     lisinopril-hydrochlorothiazide (PRINZIDE,ZESTORETIC) 10-12.5 mg per tablet 90 tablet 0     Sig: Take 1 tablet by mouth daily. for blood pressure       Diuretics/Combination Diuretics Refill Protocol  Passed - 9/6/2019  5:26 PM        Passed - Visit with PCP or prescribing provider visit in past 12 months     Last office visit with prescriber/PCP: 3/28/2019 Matteo Ward MD OR same dept: 3/28/2019 Matteo Ward MD OR same specialty: 3/28/2019 Matteo Ward MD  Last physical: 7/1/2019 Last MTM visit: Visit date not found   Next visit within 3 mo: Visit date not found  Next physical within 3 mo: Visit date not found  Prescriber OR PCP: Matteo Ward MD  Last diagnosis associated with med order: 1. Essential hypertension  - lisinopril-hydrochlorothiazide (PRINZIDE,ZESTORETIC) 10-12.5 mg per tablet; Take 1 tablet by mouth daily. for blood pressure  Dispense: 90 tablet; Refill: 0    If protocol passes may refill for 12 months if within 3 months of last provider visit (or a total of 15 months).             Passed - Serum Potassium in past 12 months      Lab Results   Component Value Date    Potassium 4.2 07/01/2019             Passed - Serum Sodium in past 12 months      Lab Results   Component Value Date    Sodium 138 07/01/2019             Passed - Blood pressure on file in past 12 months     BP Readings from Last 1 Encounters:   07/01/19 110/80             Passed - Serum Creatinine in past 12 months      Creatinine   Date Value Ref Range Status   07/01/2019 0.99 0.70 - 1.30 mg/dL Final

## 2021-06-02 VITALS — WEIGHT: 185.2 LBS | BODY MASS INDEX: 29.89 KG/M2

## 2021-06-03 VITALS — BODY MASS INDEX: 29.46 KG/M2 | HEIGHT: 66 IN | WEIGHT: 183.3 LBS

## 2021-06-04 VITALS
HEIGHT: 67 IN | TEMPERATURE: 98.3 F | BODY MASS INDEX: 27.34 KG/M2 | HEART RATE: 55 BPM | RESPIRATION RATE: 18 BRPM | SYSTOLIC BLOOD PRESSURE: 130 MMHG | WEIGHT: 174.2 LBS | OXYGEN SATURATION: 97 % | DIASTOLIC BLOOD PRESSURE: 62 MMHG

## 2021-06-06 NOTE — TELEPHONE ENCOUNTER
Refill Approved    Rx renewed per Medication Renewal Policy. Medication was last renewed on 9/2/19.    Salina Collins, [facility] Triage/Med Refill 2/26/2020     Requested Prescriptions   Pending Prescriptions Disp Refills     amLODIPine (NORVASC) 5 MG tablet 90 tablet 1     Sig: Take 1 tablet (5 mg total) by mouth daily.       Calcium-Channel Blockers Protocol Passed - 2/26/2020 11:44 AM        Passed - PCP or prescribing provider visit in past 12 months or next 3 months     Last office visit with prescriber/PCP: 3/28/2019 Matteo Ward MD OR same dept: 3/28/2019 Matteo Ward MD OR same specialty: 3/28/2019 Matteo Ward MD  Last physical: 7/1/2019 Last MTM visit: Visit date not found   Next visit within 3 mo: Visit date not found  Next physical within 3 mo: Visit date not found  Prescriber OR PCP: Matteo Ward MD  Last diagnosis associated with med order: 1. Essential hypertension  - amLODIPine (NORVASC) 5 MG tablet; Take 1 tablet (5 mg total) by mouth daily.  Dispense: 90 tablet; Refill: 1    If protocol passes may refill for 12 months if within 3 months of last provider visit (or a total of 15 months).             Passed - Blood pressure filed in past 12 months     BP Readings from Last 1 Encounters:   07/01/19 110/80

## 2021-06-08 NOTE — PROGRESS NOTES
2017     Visit Vitals     /74 (Patient Site: Right Arm, Patient Position: Sitting, Cuff Size: Adult Regular)     Pulse 67     Wt 184 lb 3.2 oz (83.6 kg)     SpO2 95%     BMI 29.73 kg/m2       Past Medical History   Diagnosis Date     Hypertension        Social History     Social History     Marital status:      Spouse name: N/A     Number of children: N/A     Years of education: N/A     Occupational History     Not on file.     Social History Main Topics     Smoking status: Never Smoker     Smokeless tobacco: Never Used     Alcohol use 8.4 oz/week     14 Glasses of wine per week     Drug use: No     Sexual activity: Yes     Partners: Female     Other Topics Concern     Not on file     Social History Narrative       Family History   Problem Relation Age of Onset     Heart disease Mother       of a heart attack.     Diabetes Mother      Heart disease Father      Found dead - therefore likely heart.     Multiple sclerosis Sister      No Medical Problems Daughter      Cancer Sister      Primary brain tumor     No Medical Problems Sister      No Medical Problems Daughter        As part of this visit I have today personally reviewed past medical history, family medical history, social history (specifically including tobacco use), current medications and intolerances/allergies.  I have updated and/or or corrected these areas of history as may have been appropriate.    No Known Allergies    Current Outpatient Prescriptions   Medication Sig Note     amLODIPine (NORVASC) 5 MG tablet Take 1 tablet (5 mg total) by mouth daily.      aspirin 81 MG EC tablet Take 81 mg by mouth daily.      atorvastatin (LIPITOR) 80 MG tablet Take 1 tablet (80 mg total) by mouth bedtime. (Patient taking differently: Take 40 mg by mouth bedtime. ) 2017: He takes 40 mg because he had diffuse muscle aches and pains, **dose related** at 80 mg a day.  2017      cholecalciferol, vitamin D3, 1,000 unit tablet Take 1,000  Units by mouth daily.      lisinopril-hydrochlorothiazide (PRINZIDE,ZESTORETIC) 10-12.5 mg per tablet TAKE 1 TABLET BY MOUTH EVERY DAY FOR BLOOD PRESSURE        Patient Active Problem List   Diagnosis     Hypercholesteremia     BP - high blood pressure     Hypovitaminosis D - Vitamin [place] almost certainly [place]     Obesity (BMI 30.0-34.9) - BMI is 30.02 on 7/29/16     Hypovitaminosis D - normal vitamin D when last checked.  VITAMIN D, TOTAL (25-HYDROXY)   Date Value Ref Range Status   08/11/2016 33.1 30.0 - 80.0 ng/mL Final       Lipid status - he is on atorvastatin, 40 mg daily.  80 mg caused major myalgia.  His pharmacist recommended he cut back to 40 mg in the myalgia went away.  That is not in keeping with my experience with statin drugs, but it certainly looks as if this is a dose-related myalgia.  Lab Results   Component Value Date    CHOL 219 (H) 08/11/2016    CHOL 229 (H) 06/05/2015    CHOL 203 (H) 05/28/2014     Lab Results   Component Value Date    HDL 63 08/11/2016    HDL 59 06/05/2015    HDL 71 05/28/2014     Lab Results   Component Value Date    LDLCALC 125 08/11/2016    LDLCALC 127 06/05/2015    LDLCALC 94.2 05/28/2014     Lab Results   Component Value Date    TRIG 155 (H) 08/11/2016    TRIG 216 (H) 06/05/2015    TRIG 189 (H) 05/28/2014     Thyroid status - normal TSH within 5 years without being on levothyroxine.  Lab Results   Component Value Date    TSH 0.86 05/28/2014       CBC monitoring - most recent CBC was normal.  Lab Results   Component Value Date    WBC 6.1 08/11/2016    HGB 15.0 08/11/2016    HCT 44.5 08/11/2016    MCV 91 08/11/2016     08/11/2016     Lipids - major myalgia with atorvastatin 80.  Pharmacist said to try 40, his old dose, and he's fine at that dose.    ED - both hard to get and to maintain.  Libido has not gone away.  I will check testosterone, free and total.      Hypertension    History of present illness:    1.  Established  hypertension  2.  Control on current medication(s) is excellent  3.  Made worse by not being on BP medication(s)  4.  Made better by being on BP medication(s)  5.  Complications of hypertension - none yet identified    Review of systems  Although entered by template, all of the system review items listed below have been verified as a result of questions individually asked during today's visit.  Cardiovascular - denies typical and atypical angina, orthopnea, PND, palpitations, edema, syncope and limiting dyspnea.  Pulmonary - denies chronic cough, congestion, wheezing and noisy breathing.  Vision - denies blurred vision, double vision and temporary loss of vision.  NM - denies transient or ongoing motor or sensory complaints as might be seen with TIA or stroke.    Results for orders placed or performed in visit on 08/11/16   Basic Metabolic Panel   Result Value Ref Range    Sodium 138 136 - 145 mmol/L    Potassium 5.0 3.5 - 5.0 mmol/L    Chloride 101 98 - 107 mmol/L    CO2 26 22 - 31 mmol/L    Anion Gap, Calculation 11 5 - 18 mmol/L    Glucose 98 70 - 125 mg/dL    Calcium 9.4 8.5 - 10.5 mg/dL    BUN 12 8 - 28 mg/dL    Creatinine 0.89 0.70 - 1.30 mg/dL    GFR MDRD Af Amer >60 >60 mL/min/1.73m2    GFR MDRD Non Af Amer >60 >60 mL/min/1.73m2       Physical examination  General - this is a well-developed, well-nourished, significantly overweight  American gentleman in no acute distress.  Chest - CTAP  Heart - regular and without murmur  Ext - no edema      Impression    1.  Erectile dysfunction without loss of libido  2.  Hypertension  3.  Dose-related statin myalgia  4.  Hypercholesterolemia.    Plan    1.  Total and free testosterone, because of his weight.  2.  Follow-up with Revatio or additional testing depending on the results of his testosterone.  3.  I discussed the possibility of a benign pituitary tumor, unlikely, but something we might need to check on.  4.  Generally encouraged weight loss.  5.   Continue atorvastatin at 40 mg daily.  6.  Return visit in 6 months.      Much or all of the text in this note was generated through the use of Dragon Dictate voice-to-text software.  Errors in spelling or words which seem out of context are unintentional.  Dragon has a significant issue with pronouns and, of course, homonyms.  Errors with words of this sort may escape editing.      Patient Instructions   A.  I have ordered a testosterone test (mail sex hormone) and that must be obtained between 7AM and 9:30 AM, any day, non-fasting is OK.  Make that appointment as you leave today, please.     B.  Normal testosterone?  Most likely this will be the case and I'll order Revatio.  See below.    Viagra, and all of the other drugs for ED (erectile dysfunction) other than daily Cialis, cost about $45 a pill.  Most insurance companies will not pay for Viagra, of course.    What to do?  How can you make the use of this drug affordable?     There is a way.    Revatio is a drug that is used to treat high blood pressure in the lungs.  It is available at very modest cost.    Viagra = sildenafil (generic name) = Revatio.    Revatio comes only in 20 mg tablets, so a 50 or 100 mg dose of Viagra would require that you use 3, 4, or 5 Revatio tablets per dose.    Revatio is available at less than $26 for 30 pills.     That means that a 100 mg dose costs less than $5.    1.  Never take Revatio more than once a day.  2.  Never take nitroglycerin for chest pain within 72 hours of Revatio.   It could cause an unsafe drop in blood pressure or even death.  3.  Revatio can cause headache, nasal stuffiness or a blue-green vision change.   These side effects are temporary, but can last up to 12 hours.  4.  Revatio can cause an erection lasting longer than 4 hours.  That is rare, but ...   It is a true medical emergency.  Run, do not walk, to the closest ER.   If untreated, this can cause a permanent loss of erections.  5.  Revatio can cause a  "temporary loss of hearing of vision.   This is very uncommon, but can be irreversible, so ...   If this happens, never take it or Cialis, Levitra or other similar drugs for ED.  6.  Some drugs can cause an unsafe increase in Revatio/silfenafil in the blood.  These drugs include erythromycin (an antibiotic not often used), itraconazole (for fungal infecitons), ketaconazole (for fungal infetions) and cimetidine (Tagemet for heartburn, available over the counter).  Be certain to ask other prescribing physicians, nurse-practitioners or physician assistants if new prescriptions could interfere with Revatio/sildenafil.  Your health and life could be affected if you do not.    =====    Use your smart phone or computer to print out (computer) or display (smart phone) a coupon code from [employer] to get the lowest cost for Revatio and figure out where to go to get it.    =====    C.  Low testosterone?  I will check a pituitary hormone     1.  Normal or low value?  That's not \"normal,\" so I will order and MRI of your pituitary.      If you get the \"MRI call,\" do not panic.  We are looking a benign pituitary tumor, and ...    I have never found one and am unlikely to fine one, but they can cause loss of vision, if present.   2.  Elevated value?  I will try Revatio.    =====    D.  Come back in six months for an annual wellness visit.  Make that appointment as you leave today, please.            "

## 2021-06-09 NOTE — TELEPHONE ENCOUNTER
Refill Request: Atorvastatin   Last filled: 7.26.2019 disp 90 refills 3  Last visit: 7.1.2019  Hypercholesteremia  - Comprehensive Metabolic Panel  - Lipid Profile

## 2021-06-10 NOTE — PROGRESS NOTES
Assessment and Plan:     1. Routine general medical examination at a health care facility      2. BP - high blood pressure  Normotensive  Continue current plan  - Comprehensive Metabolic Panel    3. Hypercholesteremia  Recheck lab  - Lipid Profile  Continue on atorvastatin    4. Vitamin D deficiency  Recheck lab  - Vitamin D, Total (25-Hydroxy)    5. Erectile dysfunction, unspecified erectile dysfunction type  Treatment options were reviewed, Sildenafil ( Revatio) may take up to 5 tab or ( total of 100 mg ) for intercourse .     No refills needed today, will call back when needed.     The patient's current medical problems were reviewed.  Patient declined PSA and prostate screening       The following health maintenance schedule was reviewed with the patient and provided in printed form in the after visit summary:   Health Maintenance   Topic Date Due     ADVANCE CARE PLANNING  06/06/1958     MEDICARE ANNUAL WELLNESS VISIT  07/01/2020     FALL RISK ASSESSMENT  07/01/2020     INFLUENZA VACCINE RULE BASED (1) 08/01/2020     LIPID  07/01/2024     TD 18+ HE  07/01/2029     PNEUMOCOCCAL IMMUNIZATION 65+ LOW/MEDIUM RISK  Completed     ZOSTER VACCINES  Completed     HEPATITIS B VACCINES  Aged Out        Subjective:   Chief Complaint: Go Benton is an 80 y.o. male here for an Annual Wellness visit.   HPI: He has no concerns about his current management for high blood pressure hypercholesterolemia.  However questioning if there are other options available for ED, as his current medication ( taking Revatio up to 60 mg) has not been to be helping much.    Review of Systems:      Please see above.  The rest of the review of systems are negative for all systems.  Goes to dermatologist for full skin evaluation annually.       Patient Care Team:  Matteo Ward MD as PCP - General (Family Medicine)  Matteo Ward MD as Assigned PCP     Patient Active Problem List   Diagnosis     Hypercholesteremia      BP - high blood pressure     Hypovitaminosis D - Vitamin [place] almost certainly [place]     Overweight (BMI 25.0-29.9)     ED (erectile dysfunction)     H/O colonoscopy- nl      Past Medical History:   Diagnosis Date     Cataract 2018     Cataract 2018     Hypertension       Past Surgical History:   Procedure Laterality Date     APPENDECTOMY       CATARACT EXTRACTION        Family History   Problem Relation Age of Onset     Heart disease Mother          of a heart attack.     Diabetes Mother      Heart disease Father         Found dead - therefore likely heart.     Multiple sclerosis Sister      No Medical Problems Daughter      Cancer Sister         Primary brain tumor     No Medical Problems Sister      No Medical Problems Daughter       Social History     Socioeconomic History     Marital status:      Spouse name: Not on file     Number of children: Not on file     Years of education: Not on file     Highest education level: Not on file   Occupational History     Not on file   Social Needs     Financial resource strain: Not on file     Food insecurity     Worry: Not on file     Inability: Not on file     Transportation needs     Medical: Not on file     Non-medical: Not on file   Tobacco Use     Smoking status: Never Smoker     Smokeless tobacco: Never Used   Substance and Sexual Activity     Alcohol use: Yes     Alcohol/week: 14.0 standard drinks     Types: 14 Glasses of wine per week     Drug use: No     Sexual activity: Yes     Partners: Female   Lifestyle     Physical activity     Days per week: Not on file     Minutes per session: Not on file     Stress: Not on file   Relationships     Social connections     Talks on phone: Not on file     Gets together: Not on file     Attends Restorationism service: Not on file     Active member of club or organization: Not on file     Attends meetings of clubs or organizations: Not on file     Relationship status: Not on file      "Intimate partner violence     Fear of current or ex partner: Not on file     Emotionally abused: Not on file     Physically abused: Not on file     Forced sexual activity: Not on file   Other Topics Concern     Not on file   Social History Narrative     Not on file      Current Outpatient Medications   Medication Sig Dispense Refill     amLODIPine (NORVASC) 5 MG tablet Take 1 tablet (5 mg total) by mouth daily. 90 tablet 1     aspirin 81 MG EC tablet Take 81 mg by mouth daily.       atorvastatin (LIPITOR) 20 MG tablet Take 1 tablet (20 mg total) by mouth daily. 90 tablet 0     cholecalciferol, vitamin D3, 1,000 unit tablet Take 1,000 Units by mouth daily.       lisinopril-hydrochlorothiazide (PRINZIDE,ZESTORETIC) 10-12.5 mg per tablet Take 1 tablet by mouth daily. for blood pressure 90 tablet 3     No current facility-administered medications for this visit.       Objective:   Vital Signs:   Visit Vitals  /62 (Patient Site: Right Arm, Patient Position: Sitting, Cuff Size: Adult Regular)   Pulse (!) 55   Temp 98.3  F (36.8  C) (Tympanic)   Resp 18   Ht 5' 6.5\" (1.689 m)   Wt 174 lb 3.2 oz (79 kg)   SpO2 97%   BMI 27.70 kg/m           VisionScreening:  No exam data present     PHYSICAL EXAM  General Appearance:    Alert, well hydrated, no distress,    Eyes:    PERRL, conjunctiva/corneas clear,    Throat:   Lips, mucosa, and tongue normal; teeth and gums normal   Neck:   Supple, symmetrical, trachea midline, no adenopathy;        thyroid:  No enlargement/tenderness/nodules; no carotid    bruit or JVD   Lungs:     Clear to auscultation bilaterally, respirations unlabored   Heart:    Regular rate and rhythm, S1 and S2 normal, no murmur, rub   or gallop   Abdomen:     Soft, non-tender, normal bowel sounds, no rebound or guarding, no masses, no organomegaly   Extremities:   Extremities normal, atraumatic, no cyanosis or edema   Skin:   Skin color, texture, turgor normal, no rashes or lesions        Assessment Results " 8/7/2020   Activities of Daily Living No help needed   Instrumental Activities of Daily Living No help needed   Mini Cog Total Score 2   Some recent data might be hidden     A Mini-Cog score of 0-2 suggests the possibility of dementia, score of 3-5 suggests no dementia      Identified Health Risks:     He is at risk for lack of exercise and has been provided with information to increase physical activity for the benefit of his well-being.  The patient was counseled and encouraged to consider modifying their diet and eating habits. He was provided with information on recommended healthy diet options.  Patient's advanced directive was discussed and has one on file  the patient's wishes.        The patient was provided with appropriate referrals to address his memory problem.

## 2021-06-10 NOTE — TELEPHONE ENCOUNTER
Medication Request  Medication name: Sildenafil 20 mg  Requested Pharmacy: [facility]  Reason for request: refill request received, medication is not on active list  When did you use medication last?:  Last fill 12.17.2019 for #50  Patient offered appointment:  N/A - electronic request  Okay to leave a detailed message: yes

## 2021-06-10 NOTE — TELEPHONE ENCOUNTER
Refill Request: Amlodipine   Last filled: 2.26.2020 disp 90 refills 1  Last visit: 8.7.2020  2. BP - high blood pressure  Normotensive  Continue current plan  - Comprehensive Metabolic Panel     No upcoming appts.

## 2021-06-10 NOTE — TELEPHONE ENCOUNTER
Last Office Visit  8/07/2020 Matteo Ward MD  Notes:  1. Routine general medical examination at a health care facility        2. BP - high blood pressure  Normotensive  Continue current plan  - Comprehensive Metabolic Panel     3. Hypercholesteremia  Recheck lab  - Lipid Profile  Continue on atorvastatin     4. Vitamin D deficiency  Recheck lab  - Vitamin D, Total (25-Hydroxy)     5. Erectile dysfunction, unspecified erectile dysfunction type  Treatment options were reviewed, Sildenafil ( Revatio) may take up to 5 tab or ( total of 100 mg ) for intercourse .      No refills needed today, will call back when needed.     Last Filled: not on active med list    sildenafil (VIAGRA) 100 MG tablet (Discontinued)  10 tablet  3  3/28/2019  3/29/2019  No    Sig - Route: Take 1 tablet (100 mg total) by mouth daily as needed for erectile dysfunction. - Oral    Class: Print          Next OV:  Visit date not found        Medication teed up for provider signature

## 2021-06-10 NOTE — TELEPHONE ENCOUNTER
Patient has appointment on 8/7/20, took last pill last night and is asking for a bridge ASAP till seen.    Refill Request  Did you contact pharmacy: No  Medication name:   Requested Prescriptions     Pending Prescriptions Disp Refills     atorvastatin (LIPITOR) 20 MG tablet 90 tablet 3     Sig: Take 1 tablet (20 mg total) by mouth daily.     Who prescribed the medication: Matteo Ward MD    Requested [facility]  Is patient out of medication: Yes  Patient notified refills processed in 3 business days:  yes  Okay to leave a detailed message: yes

## 2021-06-10 NOTE — TELEPHONE ENCOUNTER
pls clarify the medication.  Is the patient wanting Revatio ( sildenafil) or Viagra ( sildenafil)?  The former is expected to be cheaper. I see that th prescription was recently written but not sure if inadvertently set to print or given to the patient. pls check with the patient to clarify. - Thank you.

## 2021-06-10 NOTE — TELEPHONE ENCOUNTER
RN cannot approve Refill Request    RN can NOT refill this medication Protocol failed and NO refill given. Last office visit: 3/28/2019 Matteo Ward MD Last Physical: 7/1/2019 Last MTM visit: Visit date not found Last visit same specialty: Visit date not found.  Next visit within 3 mo: Visit date not found  Next physical within 3 mo: Visit date not found      Salina Collins, [facility] Triage/Med Refill 8/4/2020    Requested Prescriptions   Pending Prescriptions Disp Refills     atorvastatin (LIPITOR) 20 MG tablet 90 tablet 3     Sig: Take 1 tablet (20 mg total) by mouth daily.       Statins Refill Protocol (Hmg CoA Reductase Inhibitors) Failed - 8/4/2020  8:44 AM        Failed - PCP or prescribing provider visit in past 12 months      Last office visit with prescriber/PCP: 3/28/2019 Matteo Ward MD OR same dept: Visit date not found OR same specialty: Visit date not found  Last physical: 7/1/2019 Last MTM visit: Visit date not found   Next visit within 3 mo: Visit date not found  Next physical within 3 mo: Visit date not found  Prescriber OR PCP: Matteo Ward MD  Last diagnosis associated with med order: 1. Hyperlipidemia  - atorvastatin (LIPITOR) 20 MG tablet; Take 1 tablet (20 mg total) by mouth daily.  Dispense: 90 tablet; Refill: 3    If protocol passes may refill for 12 months if within 3 months of last provider visit (or a total of 15 months).

## 2021-06-12 NOTE — TELEPHONE ENCOUNTER
Refill Approved    Rx renewed per Medication Renewal Policy. Medication was last renewed on 8/4/20, last OV 8/7/20.    Christiane Meeks, [facility] Triage/Med Refill 10/25/2020     Requested Prescriptions   Pending Prescriptions Disp Refills     atorvastatin (LIPITOR) 20 MG tablet 90 tablet 0     Sig: Take 1 tablet (20 mg total) by mouth daily.       Statins Refill Protocol (Hmg CoA Reductase Inhibitors) Passed - 10/23/2020  8:17 PM        Passed - PCP or prescribing provider visit in past 12 months      Last office visit with prescriber/PCP: 3/28/2019 Matteo Ward MD OR same dept: Visit date not found OR same specialty: 3/28/2019 Matteo Ward MD  Last physical: 8/7/2020 Last MTM visit: Visit date not found   Next visit within 3 mo: Visit date not found  Next physical within 3 mo: Visit date not found  Prescriber OR PCP: Matteo Ward MD  Last diagnosis associated with med order: 1. Hyperlipidemia  - atorvastatin (LIPITOR) 20 MG tablet; Take 1 tablet (20 mg total) by mouth daily.  Dispense: 90 tablet; Refill: 0    If protocol passes may refill for 12 months if within 3 months of last provider visit (or a total of 15 months).

## 2021-06-13 NOTE — TELEPHONE ENCOUNTER
FYI - Status Update  Who is Calling: Spouse  Update: Patient has been out of this medication for two days.  Pharmacy states they sent a request but no encounters are found.  Please send medication today.  Okay to leave a detailed message?:  No return call needed

## 2021-06-13 NOTE — TELEPHONE ENCOUNTER
Refill Approved    Rx renewed per Medication Renewal Policy. Medication was last renewed on 9/27/19.    Salina Collins, [facility] Triage/Med Refill 12/1/2020     Requested Prescriptions   Pending Prescriptions Disp Refills     lisinopriL-hydrochlorothiazide (PRINZIDE,ZESTORETIC) 10-12.5 mg per tablet 90 tablet 3     Sig: Take 1 tablet by mouth daily. for blood pressure       Diuretics/Combination Diuretics Refill Protocol  Passed - 12/1/2020  9:39 AM        Passed - Visit with PCP or prescribing provider visit in past 12 months     Last office visit with prescriber/PCP: 3/28/2019 Matteo Ward MD OR same dept: Visit date not found OR same specialty: 3/28/2019 Matteo Ward MD  Last physical: 8/7/2020 Last MTM visit: Visit date not found   Next visit within 3 mo: Visit date not found  Next physical within 3 mo: Visit date not found  Prescriber OR PCP: Matteo Ward MD  Last diagnosis associated with med order: 1. Essential hypertension  - lisinopriL-hydrochlorothiazide (PRINZIDE,ZESTORETIC) 10-12.5 mg per tablet; Take 1 tablet by mouth daily. for blood pressure  Dispense: 90 tablet; Refill: 3    If protocol passes may refill for 12 months if within 3 months of last provider visit (or a total of 15 months).             Passed - Serum Potassium in past 12 months      Lab Results   Component Value Date    Potassium 4.8 08/07/2020             Passed - Serum Sodium in past 12 months      Lab Results   Component Value Date    Sodium 138 08/07/2020             Passed - Blood pressure on file in past 12 months     BP Readings from Last 1 Encounters:   08/07/20 130/62             Passed - Serum Creatinine in past 12 months      Creatinine   Date Value Ref Range Status   08/07/2020 0.93 0.70 - 1.30 mg/dL Final

## 2021-06-15 PROBLEM — N52.9 ED (ERECTILE DYSFUNCTION): Status: ACTIVE | Noted: 2017-01-24

## 2021-06-15 PROBLEM — E66.3 OVERWEIGHT: Status: ACTIVE | Noted: 2017-01-24

## 2021-06-16 PROBLEM — R39.14 BENIGN PROSTATIC HYPERPLASIA WITH INCOMPLETE BLADDER EMPTYING: Status: ACTIVE | Noted: 2021-05-03

## 2021-06-16 PROBLEM — N40.1 BENIGN PROSTATIC HYPERPLASIA WITH INCOMPLETE BLADDER EMPTYING: Status: ACTIVE | Noted: 2021-05-03

## 2021-06-17 NOTE — PROGRESS NOTES
"  Assessment & Plan   Problem List Items Addressed This Visit     Essential hypertension     Well-controlled with occasional symptoms of hypotension.  Additionally with start of Flomax will stop amlodipine.  If further blood pressure control needed in future will look at adjusting lisinopril or hydrochlorothiazide.         ED (erectile dysfunction)     Previously responded well to sildenafil 100 mg as needed.  Given the decrease in effectiveness over the past year as well as the BPH symptoms most likely reduced efficacy from BPH and thus will treat BPH before adjusting sildenafil.         Benign prostatic hyperplasia with incomplete bladder emptying     Will start tamsulosin at 0.4 mg daily.  Expect improvement of either nocturia, bladder emptying, or erections by the end of 1 month of use.  If not improved then will send to urology for further evaluation and treatment.         Relevant Medications    tamsulosin (FLOMAX) 0.4 mg cap      Other Visit Diagnoses     Encounter to establish care    -  Primary             BMI:   Estimated body mass index is 28.98 kg/m  as calculated from the following:    Height as of this encounter: 5' 5.75\" (1.67 m).    Weight as of this encounter: 178 lb 3.2 oz (80.8 kg).   I have had an Advance Directives discussion with the patient.      Return in about 3 months (around 8/1/2021) for Annual physical.  Subjective   Go Benton is 80 y.o. and presents to clinic today for the following health issues   Establish care as well as erectile dysfunction.  Patient has been on current medications since at least 2017.  However he has noticed in the past year he is having more more difficulty with erectile dysfunction and even increasing his dose of sildenafil to 100 mg is not achieving adequate erections.  Up until a year ago they were working well with sildenafil at 60 mg.  He has noticed over the past year that he is now getting up to sometimes 3 times a night to urinate.  Also when he " "urinates he does not feel like he is emptying his bladder fully and that there has been a decrease in the urinary stream.  Sometimes he does have difficulty starting his urinary stream but that is rare.  Also with now moving to the local area he would like to establish care at this clinic.     Review of Systems   All other systems reviewed and are negative.        Objective    /64 (Patient Site: Left Arm, Patient Position: Sitting, Cuff Size: Adult Large)   Pulse 60   Temp 98.1  F (36.7  C) (Oral)   Resp 12   Ht 5' 5.75\" (1.67 m)   Wt 178 lb 3.2 oz (80.8 kg)   BMI 28.98 kg/m    Body mass index is 28.98 kg/m .  Physical Exam   Constitutional: He is oriented to person, place, and time. He appears well-developed and well-nourished.   HENT:   Head: Normocephalic and atraumatic.   Eyes: Conjunctivae and EOM are normal.   Cardiovascular: Normal rate and regular rhythm.   No murmur heard.  Pulmonary/Chest: Effort normal and breath sounds normal.   Neurological: He is alert and oriented to person, place, and time.   Reflex Scores:       Patellar reflexes are 2+ on the right side and 2+ on the left side.  Psychiatric: He has a normal mood and affect. His behavior is normal.   Nursing note and vitals reviewed.    "

## 2021-06-17 NOTE — PATIENT INSTRUCTIONS - HE
With the new medication I expect that by end of 1 month you will notice a definite improvement in urination. Either only needing to urinate once at night, or at minimum full emptying of bladder.

## 2021-06-18 NOTE — PROGRESS NOTES
Preoperative Exam    Scheduled Procedure: Cataracts  Surgery Date:  7/2 and 7/16  Surgery Location: [facility]    Surgeon:      Assessment/Plan:     1. Pre-op evaluation    - Electrocardiogram Perform and Read  - Basic Metabolic Panel  - HM1(CBC and Differential)  - HM1 (CBC with Diff)      2. Cataract    3. BP - high blood pressure  Normotensive        Surgical Procedure Risk: Low (reported cardiac risk generally < 1%)  Have you had prior anesthesia?: Yes  Have you or any family members had a previous anesthesia reaction:  No  Do you or any family members have a history of a clotting or bleeding disorder?: No  Cardiac Risk Assessment: no increased risk for major cardiac complications    Patient approved for surgery with general or local anesthesia.        Functional Status: Independent  Patient plans to recover at home with family.     Subjective:      Go Benton is a 78 y.o. male with h/o cataracts, which has been diminishing his vision over time, presenting for a preoperative consultation, undergoing the aforementioned procedure. He has HTN, and hyperlipidemia, for which is taking daily medication for without issues or side effects.  His Bp is said to be well controlled on the current regimen.     All other systems reviewed and are negative, other than those listed in the HPI.    Pertinent History  Do you have difficulty breathing or chest pain after walking up a flight of stairs: No  History of obstructive sleep apnea: No  Steroid use in the last 6 months: No  Frequent Aspirin/NSAID use: Yes: Not currently taking   Prior Blood Transfusion: No  Prior Blood Transfusion Reaction: No  If for some reason prior to, during or after the procedure, if it is medically indicated, would you be willing to have a blood transfusion?:  There is no transfusion refusal.    Current Outpatient Prescriptions   Medication Sig Dispense Refill     amLODIPine (NORVASC) 5 MG tablet TAKE 1 TABLET (5  MG TOTAL) BY MOUTH DAILY. 90 tablet 3     atorvastatin (LIPITOR) 20 MG tablet Take 1 tablet (20 mg total) by mouth daily. Pharmacist:  Please read note. 90 tablet 3     cholecalciferol, vitamin D3, 1,000 unit tablet Take 1,000 Units by mouth daily.       lisinopril-hydrochlorothiazide (PRINZIDE,ZESTORETIC) 10-12.5 mg per tablet TAKE 1 TABLET BY MOUTH EVERY DAY FOR BLOOD PRESSURE 90 tablet 1     sildenafil (VIAGRA) 100 MG tablet Take 1 tablet (100 mg total) by mouth daily as needed for erectile dysfunction. 30 tablet 1     aspirin 81 MG EC tablet Take 81 mg by mouth daily.       No current facility-administered medications for this visit.         No Known Allergies    Patient Active Problem List   Diagnosis     Hypercholesteremia     BP - high blood pressure     Hypovitaminosis D - Vitamin D [place] almost certainly [place]     Overweight (BMI 25.0-29.9)     Erectile dysfunction     Cataract       Past Medical History:   Diagnosis Date     Cataract 6/20/2018     Hypertension        Past Surgical History:   Procedure Laterality Date     APPENDECTOMY         Social History     Social History     Marital status:      Spouse name: N/A     Number of children: N/A     Years of education: N/A     Occupational History     Not on file.     Social History Main Topics     Smoking status: Never Smoker     Smokeless tobacco: Never Used     Alcohol use 8.4 oz/week     14 Glasses of wine per week     Drug use: No     Sexual activity: Yes     Partners: Female     Other Topics Concern     Not on file     Social History Narrative             Objective:     Vitals:    06/20/18 1032   BP: 120/62   Pulse: 60   Temp: 98.2  F (36.8  C)   TempSrc: Oral   SpO2: 95%   Weight: 179 lb 12.8 oz (81.6 kg)       Physical Exam:  General Appearance:    Alert, well hydrated, no distress,    Eyes:    PERRL, conjunctiva/corneas clear,    Throat:   Lips, mucosa, and tongue normal; teeth and gums normal   Neck:   Supple,  symmetrical, trachea midline, no adenopathy;        thyroid:  No enlargement/tenderness/nodules; no carotid    bruit or JVD   Lungs:     Clear to auscultation bilaterally, respirations unlabored   Heart:    Regular rate and rhythm, S1 and S2 normal, no murmur, rub   or gallop   Abdomen:     Soft, non-tender, normal bowel sounds, no rebound or guarding, no masses, no organomegaly   Extremities:   Extremities normal, atraumatic, no cyanosis or edema   Skin:   Skin color, texture, turgor normal, no rashes or lesions        There are no Patient Instructions on file for this visit.      Labs:  Recent Results (from the past 48 hour(s))   Electrocardiogram Perform and Read    Collection Time: 06/20/18 10:57 AM   Result Value Ref Range    SYSTOLIC BLOOD PRESSURE  mmHg    DIASTOLIC BLOOD PRESSURE  mmHg    VENTRICULAR RATE 50 BPM    ATRIAL RATE 50 BPM    P-R INTERVAL 164 ms    QRS DURATION 102 ms    Q-T INTERVAL 450 ms    QTC CALCULATION (BEZET) 410 ms    P Axis 18 degrees    R AXIS 20 degrees    T AXIS 29 degrees    MUSE DIAGNOSIS       Sinus bradycardia  Otherwise normal ECG  When compared with ECG of 25-OCT-2012 10:48,  No significant change was found  Confirmed by ELIDA GARRETT MD LOC: ([id number]) on 6/20/2018 1:11:36 PM     Basic Metabolic Panel    Collection Time: 06/20/18 11:15 AM   Result Value Ref Range    Sodium 138 136 - 145 mmol/L    Potassium 4.5 3.5 - 5.0 mmol/L    Chloride 103 98 - 107 mmol/L    CO2 27 22 - 31 mmol/L    Anion Gap, Calculation 8 5 - 18 mmol/L    Glucose 94 70 - 125 mg/dL    Calcium 9.8 8.5 - 10.5 mg/dL    BUN 12 8 - 28 mg/dL    Creatinine 0.86 0.70 - 1.30 mg/dL    GFR MDRD Af Amer >60 >60 mL/min/1.73m2    GFR MDRD Non Af Amer >60 >60 mL/min/1.73m2   HM1 (CBC with Diff)    Collection Time: 06/20/18 11:15 AM   Result Value Ref Range    WBC 5.7 4.0 - 11.0 thou/uL    RBC 4.98 4.40 - 6.20 mill/uL    Hemoglobin 15.4 14.0 - 18.0 g/dL    Hematocrit 45.5 40.0 - 54.0 %    MCV 91 80 - 100 fL    MCH 30.9  27.0 - 34.0 pg    MCHC 33.8 32.0 - 36.0 g/dL    RDW 10.7 (L) 11.0 - 14.5 %    Platelets 219 140 - 440 thou/uL    MPV 6.9 (L) 7.0 - 10.0 fL    Neutrophils % 58 50 - 70 %    Lymphocytes % 31 20 - 40 %    Monocytes % 10 2 - 10 %    Eosinophils % 1 0 - 6 %    Basophils % 0 0 - 2 %    Neutrophils Absolute 3.3 2.0 - 7.7 thou/uL    Lymphocytes Absolute 1.8 0.8 - 4.4 thou/uL    Monocytes Absolute 0.6 0.0 - 0.9 thou/uL    Eosinophils Absolute 0.1 0.0 - 0.4 thou/uL    Basophils Absolute 0.0 0.0 - 0.2 thou/uL        Immunization History   Administered Date(s) Administered     Influenza [id number], 01/20/2010     Influenza high dose, seasonal 10/29/2015, 10/27/2016, 10/18/2017     Influenza, seasonal,quad inj 6-35 mos 10/22/2014     Pneumo Conj 13-V (2010&after) 10/18/2017         Electronically signed by Matteo Ward MD 06/20/18 10:32 AM

## 2021-06-18 NOTE — PATIENT INSTRUCTIONS - HE
Patient Instructions by Matteo Ward MD at 8/7/2020  8:30 AM     Author: Matteo Ward MD Service: -- Author Type: Physician    Filed: 8/7/2020  8:38 AM Encounter Date: 8/7/2020 Status: Signed    : Matteo Ward MD (Physician)         Patient Education     Exercise for a Healthier Heart  You may wonder how you can improve the health of your heart. If youre thinking about exercise, youre on the right track. You dont need to become an athlete, but you do need a certain amount of brisk exercise to help strengthen your heart. If you have been diagnosed with a heart condition, your doctor may recommend exercise to help stabilize your condition. To help make exercise a habit, choose safe, fun activities.       Be sure to check with your health care provider before starting an exercise program.    Why exercise?  Exercising regularly offers many healthy rewards. It can help you do all of the following:    Improve your blood cholesterol levels to help prevent further heart trouble    Lower your blood pressure to help prevent a stroke or heart attack    Control diabetes, or reduce your risk of getting this disease    Improve your heart and lung function    Reach and maintain a healthy weight    Make your muscles stronger and more limber so you can stay active    Prevent falls and fractures by slowing the loss of bone mass (osteoporosis)    Manage stress better  Exercise tips  Ease into your routine. Set small goals. Then build on them.  Exercise on most days. Aim for a total of 150 or more minutes of moderate to  vigorous intensity activity each week. Consider 40 minutes, 3 to 4 times a week. For best results, activity should last for 40 minutes on average. It is OK to work up to the 40 minute period over time. Examples of moderate-intensity activity is walking one mile in 15 minutes or 30 to 45 minutes of yard work.  Step up your daily activity level. Along with your exercise program, try  being more active throughout the day. Walk instead of drive. Do more household tasks or yard work.  Choose one or more activities you enjoy. Walking is one of the easiest things you can do. You can also try swimming, riding a bike, or taking an exercise class.  Stop exercising and call your doctor if you:    Have chest pain or feel dizzy or lightheaded    Feel burning, tightness, pressure, or heaviness in your chest, neck, shoulders, back, or arms    Have unusual shortness of breath    Have increased joint or muscle pain    Have palpitations or an irregular heartbeat      7488-1330 [employer]. [address] [place] [address]. All rights reserved. This information is not intended as a substitute for professional medical care. Always follow your healthcare professional's instructions.         Patient Education   Understanding [employer] MyPlate  The USDA (US Department of Agriculture) has guidelines to help you make healthy food choices. These are called MyPlate. MyPlate shows the food groups that make up healthy meals using the image of a place setting. Before you eat, think about the healthiest choices for what to put onto your plate or into your cup or bowl. To learn more about building a healthy plate, visit www.choosemyplate.gov.       The Food Groups    Fruits: Any fruit or 100% fruit juice counts as part of the Fruit Group. Fruits may be fresh, canned, frozen, or dried, and may be whole, cut-up, or pureed. Make half your plate fruits and vegetables.    Vegetables: Any vegetable or 100% vegetable juice counts as a member of the Vegetable Group. Vegetables may be fresh, frozen, canned, or dried. They can be served raw or cooked and may be whole, cut-up, or mashed. Make half your plate fruits and vegetables.     Grains: All foods made from grains are part of the Grains Group. These include wheat, rice, oats, cornmeal, and barley such as bread, pasta, oatmeal, cereal, tortillas, and grits. Grains  should be no more than a quarter of your plate. At least half of your grains should be whole grains.    Protein: This group includes meat, poultry, seafood, beans and peas, eggs, processed soy products (like tofu), nuts (including nut butters), and seeds. Make protein choices no more than a quarter of your plate. Meat and poultry choices should be lean or low fat.    Dairy: All fluid milk products and foods made from milk that contain calcium, like yogurt and cheese are part of the Dairy Group. (Foods that have little calcium, such as cream, butter, and cream cheese, are not part of the group.) Most dairy choices should be low-fat or fat-free.    Oils: These are fats that are liquid at room temperature. They include canola, corn, olive, soybean, and sunflower oil. Foods that are mainly oil include mayonnaise, certain salad dressings, and soft margarines. You should have only 5 to 7 teaspoons of oils a day. You probably already get this much from the food you eat.  Use KitCheck to Help Build Your Meals  The SuperTracker can help you plan and track your meals and activity. You can look up individual foods to see or compare their nutritional value. You can get guidelines for what and how much you should eat. You can compare your food choices. And you can assess personal physical activities and see ways you can improve. Go to www.[employer].gov/supertracker/.    6369-3171 [employer]. [address] [place] [address]. All rights reserved. This information is not intended as a substitute for professional medical care. Always follow your healthcare professional's instructions.             Advance Directive  Patients advance directive was discussed and I am comfortable with the patients wishes.  Patient Education   Personalized Prevention Plan  You are due for the preventive services outlined below.  Your care team is available to assist you in scheduling these services.  If you have already  completed any of these items, please share that information with your care team to update in your medical record.  Health Maintenance   Topic Date Due   ? ADVANCE CARE PLANNING  06/06/1958   ? MEDICARE ANNUAL WELLNESS VISIT  07/01/2020   ? FALL RISK ASSESSMENT  07/01/2020   ? INFLUENZA VACCINE RULE BASED (1) 08/01/2020   ? LIPID  07/01/2024   ? TD 18+ HE  07/01/2029   ? PNEUMOCOCCAL IMMUNIZATION 65+ LOW/MEDIUM RISK  Completed   ? ZOSTER VACCINES  Completed   ? HEPATITIS B VACCINES  Aged Out

## 2021-06-19 NOTE — LETTER
Letter by Matteo Ward MD at      Author: Matteo Ward MD Service: -- Author Type: --    Filed:  Encounter Date: 7/3/2019 Status: (Other)         Go Benton  [address] [place] [address]             July 3, 2019         Dear Mr. Benton,    Below are the results from your recent visit:    Resulted Orders   Comprehensive Metabolic Panel   Result Value Ref Range    Sodium 138 136 - 145 mmol/L    Potassium 4.2 3.5 - 5.0 mmol/L    Chloride 101 98 - 107 mmol/L    CO2 26 22 - 31 mmol/L    Anion Gap, Calculation 11 5 - 18 mmol/L    Glucose 104 70 - 125 mg/dL    BUN 15 8 - 28 mg/dL    Creatinine 0.99 0.70 - 1.30 mg/dL    GFR MDRD Af Amer >60 >60 mL/min/1.73m2    GFR MDRD Non Af Amer >60 >60 mL/min/1.73m2    Bilirubin, Total 0.8 0.0 - 1.0 mg/dL    Calcium 9.5 8.5 - 10.5 mg/dL    Protein, Total 7.1 6.0 - 8.0 g/dL    Albumin 4.2 3.5 - 5.0 g/dL    Alkaline Phosphatase 64 45 - 120 U/L    AST 26 0 - 40 U/L    ALT 32 0 - 45 U/L    Narrative    Fasting Glucose reference range is 70-99 mg/dL per  American Diabetes Association (ADA) guidelines.   Lipid Profile   Result Value Ref Range    Triglycerides 217 (H) <=149 mg/dL    Cholesterol 193 <=199 mg/dL    LDL Calculated 87 <=129 mg/dL    HDL Cholesterol 63 >=40 mg/dL    Patient Fasting > 8hrs? Yes           Mildly elevated triglycerides   Stable cholesterol and LDL   Continue current management with the statin     Other labs normal         Please call with questions or contact us using [employer].    Sincerely,        Electronically signed by Matteo Ward MD

## 2021-06-20 NOTE — LETTER
Letter by Matteo Ward MD at      Author: Matteo Ward MD Service: -- Author Type: --    Filed:  Encounter Date: 8/11/2020 Status: (Other)         Go Benton  [address] [place] [address]             August 11, 2020         Dear Mr. Benton,    Below are the results from your recent visit:    Resulted Orders   Comprehensive Metabolic Panel   Result Value Ref Range    Sodium 138 136 - 145 mmol/L    Potassium 4.8 3.5 - 5.0 mmol/L    Chloride 102 98 - 107 mmol/L    CO2 25 22 - 31 mmol/L    Anion Gap, Calculation 11 5 - 18 mmol/L    Glucose 95 70 - 125 mg/dL    BUN 13 8 - 28 mg/dL    Creatinine 0.93 0.70 - 1.30 mg/dL    GFR MDRD Af Amer >60 >60 mL/min/1.73m2    GFR MDRD Non Af Amer >60 >60 mL/min/1.73m2    Bilirubin, Total 0.6 0.0 - 1.0 mg/dL    Calcium 9.5 8.5 - 10.5 mg/dL    Protein, Total 6.8 6.0 - 8.0 g/dL    Albumin 4.1 3.5 - 5.0 g/dL    Alkaline Phosphatase 55 45 - 120 U/L    AST 21 0 - 40 U/L    ALT 26 0 - 45 U/L    Narrative    Fasting Glucose reference range is 70-99 mg/dL per  American Diabetes Association (ADA) guidelines.   Lipid Profile   Result Value Ref Range    Triglycerides 117 <=149 mg/dL    Cholesterol 176 <=199 mg/dL    LDL Calculated 93 <=129 mg/dL    HDL Cholesterol 60 >=40 mg/dL    Patient Fasting > 8hrs? Yes    Vitamin D, Total (25-Hydroxy)   Result Value Ref Range    Vitamin D, Total (25-Hydroxy) 33.4 30.0 - 80.0 ng/mL    Narrative    Deficiency <10.0 ng/mL  Insufficiency 10.0-29.9 ng/mL  Sufficiency 30.0-80.0 ng/mL  Toxicity (possible) >100.0 ng/mL       NORMAL RESULTS    Please call with questions or contact us using [employer].    Sincerely,        Electronically signed by Matteo Ward MD

## 2021-06-20 NOTE — LETTER
Letter by Matteo Ward MD at      Author: Matteo Ward MD Service: -- Author Type: --    Filed:  Encounter Date: 7/21/2020 Status: (Other)       Go Benton  [address] [place] [address]      07/22/20      Dear Go,      In reviewing your records, we have determined an appointment is needed, please call the [facility] to schedule a:      Annual Wellness Visit/Physical      We have made attempts to call you for an appointment, please verify your contact information is correct when calling back for an appointment, or if you have transferred your care to another clinic, please contact us so we can update our records.     Please call [phone] to schedule an appointment.    We believe that a strong preventative care program, including regular physicals and follow-up care is an important part of a healthy lifestyle and we are committed to helping you maintain your health.    Thank you for choosing us as your health care provider.    Sincerely,    Ingrid Arevalo   CMA - CMT/CA  [facility]  [address] [place] [id number]  [phone]

## 2021-06-24 NOTE — TELEPHONE ENCOUNTER
Refill Request  Did you contact pharmacy: Yes  Medication name:   Requested Prescriptions     Pending Prescriptions Disp Refills     amLODIPine (NORVASC) 5 MG tablet 90 tablet 0     Sig: Take 1 tablet (5 mg total) by mouth daily.     Who prescribed the medication: Dr Ward  Pharmacy Name and Location: Change of pharmacy to [place], [place]    Is patient out of medication: Yes     Patient notified refills processed in 72 hours:  yes  Okay to leave a detailed message: yes

## 2021-06-24 NOTE — TELEPHONE ENCOUNTER
RN cannot approve Refill Request    RN can NOT refill this medication patient is requesting prescription to be sent to an out-of-state pharmacy     Michelet Terry, [facility] Triage/Med Refill 3/7/2019    Requested Prescriptions   Pending Prescriptions Disp Refills     amLODIPine (NORVASC) 5 MG tablet 90 tablet 0     Sig: Take 1 tablet (5 mg total) by mouth daily.    Calcium-Channel Blockers Protocol Passed - 3/7/2019 10:10 AM       Passed - PCP or prescribing provider visit in past 12 months or next 3 months    Last office visit with prescriber/PCP: Visit date not found OR same dept: Visit date not found OR same specialty: Visit date not found  Last physical: 6/20/2018 Last MTM visit: Visit date not found   Next visit within 3 mo: Visit date not found  Next physical within 3 mo: Visit date not found  Prescriber OR PCP: Matteo Ward MD  Last diagnosis associated with med order: 1. Essential hypertension  - amLODIPine (NORVASC) 5 MG tablet; Take 1 tablet (5 mg total) by mouth daily.  Dispense: 90 tablet; Refill: 0    If protocol passes may refill for 12 months if within 3 months of last provider visit (or a total of 15 months).            Passed - Blood pressure filed in past 12 months    BP Readings from Last 1 Encounters:   06/20/18 120/62

## 2021-06-24 NOTE — TELEPHONE ENCOUNTER
RN triage   Call from pt   Pt states he is in [place]   Informed pt that triage cannot assess or advise when pt is out of state  Pt states he talked to a pharmacist at [facility] there -- pt states he has a blister on his penis -- and [facility] told him he may have a white wart and needs a prescription   Suggested pt be seen in [place] if he has symptoms or thinks he needs a prescription   Sowmya Kirk RN [facility] RN triage

## 2021-06-25 NOTE — PROGRESS NOTES
Progress Notes by Fabian Magallanes at 10/18/2017  9:00 AM     Author: Fabian Magallanes Service: -- Author Type: Nurse Practitioner    Filed: 11/10/2017  1:22 AM Encounter Date: 10/18/2017 Status: Signed    : [facility] Magallanes [facility]    Date of Service: 10/18/2017  Primary Provider: Fabian Magallanes CNP    Patient Care Team:  Fabian Magallanes CNP as PCP - General (Nurse Practitioner)     ______________________________________________________________________     Patient's Pharmacy:    [facility]/pharmacy #[id number] - [address] [address]  Phone: [phone] Fax: [phone]     Patient's Insurance:    Payor: BLUE CROSS / Plan: BLUE CROSS PLATINUM / Product Type: COST PLAN /     ______________________________________________________________________    Assessment:    1. Encounter to establish care    2. ED (erectile dysfunction)    3. Essential hypertension    4. Hyperlipidemia    5. Encounter for vaccination       ______________________________________________________________________      PHQ-2 Total Score: 0 (10/18/2017  9:22 AM)     Plan:  Patient Instructions   1. Medications prescribed at this visit:  - sildenafil (VIAGRA) 100 MG tablet; Take 1 tablet (100 mg total) by mouth daily as needed for erectile dysfunction.  Dispense: 30 tablet; Refill: 1  - amLODIPine (NORVASC) 5 MG tablet; TAKE 1 TABLET (5 MG TOTAL) BY MOUTH DAILY.  Dispense: 90 tablet; Refill: 3  - lisinopril-hydrochlorothiazide (PRINZIDE,ZESTORETIC) 10-12.5 mg per tablet; TAKE 1 TABLET BY MOUTH EVERY DAY FOR BLOOD PRESSURE  Dispense: 90 tablet; Refill: 3  - atorvastatin (LIPITOR) 20 MG tablet; Take 1 tablet (20 mg total) by mouth daily. Pharmacist:  Please read note.  Dispense: 90 tablet; Refill: 3    2. Essential hypertension  - Lipid [place] FASTING  - Basic Metabolic Panel    3. Vaccinations given today:  - Pneumococcal conjugate vaccine  13-valent 6wks-17yrs; >50yrs  - Influenza High Dose, Seasonal 65+ yrs    4. Continue current medications          ______________________________________________________________________     Go Benton is 77 y.o. male who comes in today for:    Chief Complaint   Patient presents with   ? Establish Care     LEWIS from Dr. Stanford follow up 6 months no questions or concerns       Patient Active Problem List   Diagnosis   ? Hypercholesteremia   ? BP - high blood pressure   ? Hypovitaminosis D - [place]   ? Overweight (BMI 25.0-29.9)   ? Erectile dysfunction     Current Outpatient Prescriptions   Medication Sig   ? amLODIPine (NORVASC) 5 MG tablet TAKE 1 TABLET (5 MG TOTAL) BY MOUTH DAILY.   ? atorvastatin (LIPITOR) 20 MG tablet Take 1 tablet (20 mg total) by mouth daily. Pharmacist:  Please read note.   ? cholecalciferol, vitamin D3, 1,000 unit tablet Take 1,000 Units by mouth daily.   ? lisinopril-hydrochlorothiazide (PRINZIDE,ZESTORETIC) 10-12.5 mg per tablet TAKE 1 TABLET BY MOUTH EVERY DAY FOR BLOOD PRESSURE   ? aspirin 81 MG EC tablet Take 81 mg by mouth daily.   ? sildenafil (VIAGRA) 100 MG tablet Take 1 tablet (100 mg total) by mouth daily as needed for erectile dysfunction.     Social History     Social History   ? Marital status:      Spouse name: N/A   ? Number of children: N/A   ? Years of education: N/A     Occupational History   ? Not on file.     Social History Main Topics   ? Smoking status: Never Smoker   ? Smokeless tobacco: Never Used   ? Alcohol use 8.4 oz/week     14 Glasses of wine per week   ? Drug use: No   ? Sexual activity: Yes     Partners: Female     Other Topics Concern   ? Not on file     Social History Narrative     ______________________________________________________________________     History of present illness: Go YIN Serenity is a pleasant 77 y.o. male with a PMH pertinent for HTN, hypercholesterolemia,  "hypovitaminosis D, and erectile dysfunction who presents in clinic today for establish care, routine lab work, and medication refills.  He is doing well and has no immediate health concerns that he would like addressed at this visit.  We discussed website GoodRx and how to obtain the sildenafil Rx a bit cheaper as insurance rarely covers this.    *This is a former patient of Dr. Fabian Stanford.    *I have reviewed the patient's medical history in detail and updated the computerized patient record.    Review of systems:   10 point review of systems is negative unless noted in the HPI.      ______________________________________________________________________    Wt Readings from Last 3 Encounters:   10/18/17 177 lb 4.8 oz (80.4 kg)   01/23/17 184 lb 3.2 oz (83.6 kg)   07/29/16 186 lb (84.4 kg)     BP Readings from Last 3 Encounters:   10/18/17 118/70   01/23/17 116/74   07/29/16 122/70     /70 (Patient Site: Right Arm, Patient Position: Sitting, Cuff Size: Adult Regular)  Pulse (!) 57  Ht 5' 6\" (1.676 m)  Wt 177 lb 4.8 oz (80.4 kg)  BMI 28.62 kg/m2     Physical Exam:  General Appearance: Alert, cooperative, no distress, appears stated age  Head: Normocephalic, without obvious abnormality, atraumatic  Eyes: PERRL, conjunctiva/corneas clear, EOM's intact, wears glasses.  Ears: Normal TM's and external ear canals, both ears  Nose: Nares normal, septum midline, mucosa normal, no drainage  Throat: Lips, mucosa, and tongue normal; teeth and gums normal, no erythema, exudate, or thrush  Neck: Supple, symmetrical, trachea midline, no adenopathy; thyroid: not enlarged, symmetric, no tenderness/mass/nodules  Back: Symmetric, no curvature, ROM normal, no CVA tenderness  Lungs: Clear to auscultation bilaterally, respirations unlabored  Heart: Regular rate and rhythm, S1 and S2 normal, no murmur, rub, or gallop  Abdomen: Soft, non-tender, bowel sounds active all four quadrants,  no masses, no " organomegaly  Musculoskeletal: Normal range of motion. No joint swelling or deformity.   Extremities: Extremities normal, atraumatic, no cyanosis or edema  Skin: Skin color, texture, turgor normal, no rashes or lesions  Lymph nodes: Cervical & supraclavicular nodes normal  Neurologic: He is alert & oriented x 3. He has normal gait.  Psychiatric: He has a normal mood and affect.     Diagnostics:   Results for orders placed or performed in visit on 10/18/17   Lipid [place] FASTING   Result Value Ref Range    Cholesterol 153 <=199 mg/dL    Triglycerides 114 <=149 mg/dL    HDL Cholesterol 50 >=40 mg/dL    LDL Calculated 80 <=129 mg/dL    Patient Fasting > 8hrs? Yes    Basic Metabolic Panel   Result Value Ref Range    Sodium 137 136 - 145 mmol/L    Potassium 4.5 3.5 - 5.0 mmol/L    Chloride 100 98 - 107 mmol/L    CO2 25 22 - 31 mmol/L    Anion Gap, Calculation 12 5 - 18 mmol/L    Glucose 99 70 - 125 mg/dL    Calcium 9.7 8.5 - 10.5 mg/dL    BUN 13 8 - 28 mg/dL    Creatinine 0.85 0.70 - 1.30 mg/dL    GFR MDRD Af Amer >60 >60 mL/min/1.73m2    GFR MDRD Non Af Amer >60 >60 mL/min/1.73m2       Fabian Magallanes CNP  Internal Medicine  [facility]     Return in about 1 year (around 10/18/2018).

## 2021-07-02 ENCOUNTER — RECORDS - HEALTHEAST (OUTPATIENT)
Dept: ADMINISTRATIVE | Facility: OTHER | Age: 81
End: 2021-07-02

## 2021-08-22 ENCOUNTER — HOSPITAL ENCOUNTER (OUTPATIENT)
Facility: HOSPITAL | Age: 81
Setting detail: OBSERVATION
Discharge: HOME | End: 2021-08-23
Attending: EMERGENCY MEDICINE | Admitting: EMERGENCY MEDICINE
Payer: MEDICARE

## 2021-08-22 DIAGNOSIS — R55 NEAR SYNCOPE: ICD-10-CM

## 2021-08-22 PROBLEM — R00.1 SINUS BRADYCARDIA: Status: ACTIVE | Noted: 2021-08-22

## 2021-08-22 LAB
ALBUMIN UR-MCNC: NEGATIVE MG/DL
ANION GAP SERPL CALCULATED.3IONS-SCNC: 11 MMOL/L (ref 5–18)
APPEARANCE UR: CLEAR
BILIRUB UR QL STRIP: NEGATIVE
BUN SERPL-MCNC: 16 MG/DL (ref 8–28)
CALCIUM SERPL-MCNC: 8.9 MG/DL (ref 8.5–10.5)
CHLORIDE BLD-SCNC: 103 MMOL/L (ref 98–107)
CO2 SERPL-SCNC: 22 MMOL/L (ref 22–31)
COLOR UR AUTO: ABNORMAL
CREAT SERPL-MCNC: 0.88 MG/DL (ref 0.7–1.3)
ERYTHROCYTE [DISTWIDTH] IN BLOOD BY AUTOMATED COUNT: 12.3 % (ref 10–15)
GFR SERPL CREATININE-BSD FRML MDRD: 81 ML/MIN/1.73M2
GLUCOSE BLD-MCNC: 117 MG/DL (ref 70–125)
GLUCOSE UR STRIP-MCNC: NEGATIVE MG/DL
HCT VFR BLD AUTO: 43.5 % (ref 40–53)
HGB BLD-MCNC: 14.2 G/DL (ref 13.3–17.7)
HGB UR QL STRIP: NEGATIVE
HOLD SPECIMEN: NORMAL
KETONES UR STRIP-MCNC: 10 MG/DL
LEUKOCYTE ESTERASE UR QL STRIP: NEGATIVE
MAGNESIUM SERPL-MCNC: 1.7 MG/DL (ref 1.8–2.6)
MCH RBC QN AUTO: 30.3 PG (ref 26.5–33)
MCHC RBC AUTO-ENTMCNC: 32.6 G/DL (ref 31.5–36.5)
MCV RBC AUTO: 93 FL (ref 78–100)
MUCOUS THREADS #/AREA URNS LPF: PRESENT /LPF
NITRATE UR QL: NEGATIVE
PH UR STRIP: 5.5 [PH] (ref 5–7)
PLATELET # BLD AUTO: 232 10E3/UL (ref 150–450)
POTASSIUM BLD-SCNC: 3.9 MMOL/L (ref 3.5–5)
POTASSIUM BLD-SCNC: 4.1 MMOL/L (ref 3.5–5)
RBC # BLD AUTO: 4.68 10E6/UL (ref 4.4–5.9)
RBC URINE: 1 /HPF
SARS-COV-2 RNA RESP QL NAA+PROBE: NEGATIVE
SODIUM SERPL-SCNC: 136 MMOL/L (ref 136–145)
SP GR UR STRIP: 1.02 (ref 1–1.03)
TROPONIN I SERPL-MCNC: 0.01 NG/ML (ref 0–0.29)
TROPONIN I SERPL-MCNC: 0.01 NG/ML (ref 0–0.29)
UROBILINOGEN UR STRIP-MCNC: <2 MG/DL
WBC # BLD AUTO: 11.4 10E3/UL (ref 4–11)
WBC URINE: 0 /HPF

## 2021-08-22 PROCEDURE — 250N000013 HC RX MED GY IP 250 OP 250 PS 637: Performed by: HOSPITALIST

## 2021-08-22 PROCEDURE — C9803 HOPD COVID-19 SPEC COLLECT: HCPCS

## 2021-08-22 PROCEDURE — 87635 SARS-COV-2 COVID-19 AMP PRB: CPT | Performed by: EMERGENCY MEDICINE

## 2021-08-22 PROCEDURE — 84484 ASSAY OF TROPONIN QUANT: CPT | Mod: 91 | Performed by: EMERGENCY MEDICINE

## 2021-08-22 PROCEDURE — 83735 ASSAY OF MAGNESIUM: CPT | Performed by: HOSPITALIST

## 2021-08-22 PROCEDURE — 84132 ASSAY OF SERUM POTASSIUM: CPT | Performed by: HOSPITALIST

## 2021-08-22 PROCEDURE — G0378 HOSPITAL OBSERVATION PER HR: HCPCS

## 2021-08-22 PROCEDURE — 99285 EMERGENCY DEPT VISIT HI MDM: CPT | Mod: 25

## 2021-08-22 PROCEDURE — 99220 PR INITIAL OBSERVATION CARE,LEVEL III: CPT | Performed by: HOSPITALIST

## 2021-08-22 PROCEDURE — 93005 ELECTROCARDIOGRAM TRACING: CPT | Performed by: EMERGENCY MEDICINE

## 2021-08-22 PROCEDURE — 36415 COLL VENOUS BLD VENIPUNCTURE: CPT | Performed by: EMERGENCY MEDICINE

## 2021-08-22 PROCEDURE — 36415 COLL VENOUS BLD VENIPUNCTURE: CPT | Performed by: HOSPITALIST

## 2021-08-22 PROCEDURE — 81001 URINALYSIS AUTO W/SCOPE: CPT | Performed by: EMERGENCY MEDICINE

## 2021-08-22 PROCEDURE — 85027 COMPLETE CBC AUTOMATED: CPT | Performed by: EMERGENCY MEDICINE

## 2021-08-22 PROCEDURE — 258N000003 HC RX IP 258 OP 636: Performed by: EMERGENCY MEDICINE

## 2021-08-22 PROCEDURE — 84484 ASSAY OF TROPONIN QUANT: CPT | Performed by: HOSPITALIST

## 2021-08-22 PROCEDURE — 80048 BASIC METABOLIC PNL TOTAL CA: CPT | Performed by: EMERGENCY MEDICINE

## 2021-08-22 PROCEDURE — 99207 PR CDG-CODE CATEGORY CHANGED: CPT | Performed by: HOSPITALIST

## 2021-08-22 RX ORDER — AMOXICILLIN 250 MG
2 CAPSULE ORAL 2 TIMES DAILY PRN
Status: DISCONTINUED | OUTPATIENT
Start: 2021-08-22 | End: 2021-08-23 | Stop reason: HOSPADM

## 2021-08-22 RX ORDER — NAPROXEN SODIUM 220 MG
220 TABLET ORAL 2 TIMES DAILY PRN
Status: ON HOLD | COMMUNITY
End: 2021-08-23

## 2021-08-22 RX ORDER — ATORVASTATIN CALCIUM 10 MG/1
20 TABLET, FILM COATED ORAL DAILY
Status: DISCONTINUED | OUTPATIENT
Start: 2021-08-22 | End: 2021-08-23 | Stop reason: HOSPADM

## 2021-08-22 RX ORDER — ONDANSETRON 2 MG/ML
4 INJECTION INTRAMUSCULAR; INTRAVENOUS EVERY 6 HOURS PRN
Status: DISCONTINUED | OUTPATIENT
Start: 2021-08-22 | End: 2021-08-23 | Stop reason: HOSPADM

## 2021-08-22 RX ORDER — TAMSULOSIN HYDROCHLORIDE 0.4 MG/1
0.4 CAPSULE ORAL
Status: DISCONTINUED | OUTPATIENT
Start: 2021-08-23 | End: 2021-08-23 | Stop reason: HOSPADM

## 2021-08-22 RX ORDER — FAMOTIDINE 20 MG/1
20 TABLET, FILM COATED ORAL 2 TIMES DAILY PRN
Status: DISCONTINUED | OUTPATIENT
Start: 2021-08-22 | End: 2021-08-23 | Stop reason: HOSPADM

## 2021-08-22 RX ORDER — LIDOCAINE 40 MG/G
CREAM TOPICAL
Status: DISCONTINUED | OUTPATIENT
Start: 2021-08-22 | End: 2021-08-23 | Stop reason: HOSPADM

## 2021-08-22 RX ORDER — AMOXICILLIN 250 MG
1 CAPSULE ORAL 2 TIMES DAILY PRN
Status: DISCONTINUED | OUTPATIENT
Start: 2021-08-22 | End: 2021-08-23 | Stop reason: HOSPADM

## 2021-08-22 RX ORDER — MAGNESIUM HYDROXIDE/ALUMINUM HYDROXICE/SIMETHICONE 120; 1200; 1200 MG/30ML; MG/30ML; MG/30ML
30 SUSPENSION ORAL EVERY 4 HOURS PRN
Status: DISCONTINUED | OUTPATIENT
Start: 2021-08-22 | End: 2021-08-23 | Stop reason: HOSPADM

## 2021-08-22 RX ORDER — ONDANSETRON 4 MG/1
4 TABLET, ORALLY DISINTEGRATING ORAL EVERY 6 HOURS PRN
Status: DISCONTINUED | OUTPATIENT
Start: 2021-08-22 | End: 2021-08-23 | Stop reason: HOSPADM

## 2021-08-22 RX ORDER — LISINOPRIL 5 MG/1
10 TABLET ORAL DAILY
Status: DISCONTINUED | OUTPATIENT
Start: 2021-08-23 | End: 2021-08-23 | Stop reason: HOSPADM

## 2021-08-22 RX ORDER — FAMOTIDINE 20 MG/1
20 TABLET, FILM COATED ORAL 2 TIMES DAILY PRN
COMMUNITY
End: 2021-10-11

## 2021-08-22 RX ORDER — ACETAMINOPHEN 325 MG/1
650 TABLET ORAL EVERY 6 HOURS PRN
Status: DISCONTINUED | OUTPATIENT
Start: 2021-08-22 | End: 2021-08-23 | Stop reason: HOSPADM

## 2021-08-22 RX ORDER — ASPIRIN 81 MG/1
81 TABLET ORAL DAILY
Status: DISCONTINUED | OUTPATIENT
Start: 2021-08-23 | End: 2021-08-23 | Stop reason: HOSPADM

## 2021-08-22 RX ORDER — PROCHLORPERAZINE 25 MG
12.5 SUPPOSITORY, RECTAL RECTAL EVERY 12 HOURS PRN
Status: DISCONTINUED | OUTPATIENT
Start: 2021-08-22 | End: 2021-08-23 | Stop reason: HOSPADM

## 2021-08-22 RX ORDER — ACETAMINOPHEN 650 MG/1
650 SUPPOSITORY RECTAL EVERY 6 HOURS PRN
Status: DISCONTINUED | OUTPATIENT
Start: 2021-08-22 | End: 2021-08-23 | Stop reason: HOSPADM

## 2021-08-22 RX ORDER — PROCHLORPERAZINE MALEATE 5 MG
5 TABLET ORAL EVERY 6 HOURS PRN
Status: DISCONTINUED | OUTPATIENT
Start: 2021-08-22 | End: 2021-08-23 | Stop reason: HOSPADM

## 2021-08-22 RX ADMIN — SODIUM CHLORIDE 500 ML: 9 INJECTION, SOLUTION INTRAVENOUS at 13:13

## 2021-08-22 RX ADMIN — ATORVASTATIN CALCIUM 20 MG: 10 TABLET, FILM COATED ORAL at 20:31

## 2021-08-22 ASSESSMENT — MIFFLIN-ST. JEOR
SCORE: 1481.02
SCORE: 1442.01

## 2021-08-22 NOTE — ED PROVIDER NOTES
EMERGENCY DEPARTMENT ENCOUnter      NAME: Go Benton  AGE: 81 year old male  YOB: 1940  MRN: [id number]  EVALUATION DATE & TIME: 2021  1:04 PM    PCP: Asim Howard    ED PROVIDER: Gonzalo Peralta DO      Chief Complaint   Patient presents with     Near syncope         FINAL IMPRESSION:  1. Near syncope          ED COURSE & MEDICAL DECISION MAKIN:00 PM I performed my initial history and physical exam as well as discussed ED course and plan in room 5  3:14 PM I spoke with the hospitalist, Dr. Benedict regarding the patient who recommends admission    The patient presented emergency department today following the sudden onset of lightheadedness, nausea, and diaphoresis while outside in his yard.  Initial heart rate was found to be around 40 by EMS.  He has been feeling better since arrival to the ER.  Laboratory testing and EKG have been unremarkable.  Given his near syncopal episode today we will plan to watch him overnight on telemetry for further evaluation.  He is comfortable with this plan.    At the conclusion of the encounter I discussed the results of all of the tests and the disposition. The questions were answered. The patient or family acknowledged understanding and was agreeable with the care plan.            =================================================================    HPI        Go Benton is a 81 year old male with a pertinent history of hypertension and hypercholesteremia who presents to this ED via EMS for evaluation of weakness, lightheadedness, and bradycardia    Patient reports feeling rapid onset of weakness, lightheadedness, pallor, diaphoresis, and bradycardia while doing yard work at his home in [place] starting at 1045. He went inside to lay down and EMS was called. Patient's heart rate was 40 and had a  BP of 146/60 initially. When he stood up to get onto the EMS stretcher, patient had 2 episodes of nausea and vomiting. Patient  given 4mg of Zofran and is feeling better but still weaker than usual. He has no history of low heart rate and this has never happened before. Patient was feeling fine prior to his onset of symptoms. He endorses borderline syncope but did not fully lose consciousness.    Patient denies pain, falls, fever, or any other symptoms at this time. Patient does not have any allergies to medications and has not sustained any recent lifestyle changes.    REVIEW OF SYSTEMS     Constitutional:  Denies fever. Positive for diaphoresis   GI:  Positive for nausea, vomiting(2x)  Skin:  Positive for pallor   Neurologic: Positive for dizziness, borderline syncope, lightheadedness, and weakness   All other systems reviewed and are negative        PAST MEDICAL HISTORY:  Past Medical History:   Diagnosis Date     Cataract 2018     Cataract 2018     ED (erectile dysfunction) 2017     Essential hypertension     Created by Conversion  Replacement Utility updated for latest IMO load     Hypercholesteremia     Created by Conversion      Hypertension      Hypovitaminosis D 2016     Skin cancer of nose 2019       PAST SURGICAL HISTORY:  Past Surgical History:   Procedure Laterality Date     APPENDECTOMY       CATARACT EXTRACTION             CURRENT MEDICATIONS:    aspirin 81 MG EC tablet  atorvastatin (LIPITOR) 20 MG tablet  cholecalciferol, vitamin D3, 1,000 unit tablet  lisinopriL-hydrochlorothiazide (PRINZIDE,ZESTORETIC) 10-12.5 mg per tablet  sildenafil (REVATIO) 20 mg tablet  tamsulosin (FLOMAX) 0.4 mg cap        ALLERGIES:  No Known Allergies    FAMILY HISTORY:  Family History   Problem Relation Age of Onset     Heart Disease Mother          of a heart attack.     Hypertension Mother      Cerebrovascular Disease Mother      Heart Disease Father         Found dead - therefore likely heart.     Diabetes Father      Hypertension Father      No Known Problems Sister      No Known Problems Daughter      Multiple  Sclerosis Sister      Brain Cancer Sister      No Known Problems Daughter      No Known Problems Maternal Grandmother      No Known Problems Maternal Grandfather      Heart Disease Paternal Grandmother      Heart Disease Paternal Grandfather        SOCIAL HISTORY:   Social History     Socioeconomic History     Marital status:      Spouse name: Not on file     Number of children: 2     Years of education: 13     Highest education level: Not on file   Occupational History     Not on file   Tobacco Use     Smoking status: Never Smoker     Smokeless tobacco: Never Used   Substance and Sexual Activity     Alcohol use: Yes     Alcohol/week: 14.0 standard drinks     Drug use: Never     Sexual activity: Not on file   Other Topics Concern     Not on file   Social History Narrative     Not on file     Social Determinants of Health     Financial Resource Strain:      Difficulty of Paying Living Expenses:    Food Insecurity:      Worried About Running Out of Food in the Last Year:      Ran Out of Food in the Last Year:    Transportation Needs:      Lack of Transportation (Medical):      Lack of Transportation (Non-Medical):    Physical Activity:      Days of Exercise per Week:      Minutes of Exercise per Session:    Stress:      Feeling of Stress :    Social Connections:      Frequency of Communication with Friends and Family:      Frequency of Social Gatherings with Friends and Family:      Attends Holiness Services:      Active Member of Clubs or Organizations:      Attends Club or Organization Meetings:      Marital Status:    Intimate Partner Violence:      Fear of Current or Ex-Partner:      Emotionally Abused:      Physically Abused:      Sexually Abused:        VITALS:  Patient Vitals for the past 24 hrs:   BP Temp Temp src Pulse Resp SpO2 Height Weight   08/22/21 1500 118/60 -- -- 53 17 94 % -- --   08/22/21 1400 117/58 -- -- 52 18 93 % -- --   08/22/21 1345 117/57 -- -- 51 18 93 % -- --   08/22/21 1316 134/65  "97.6  F (36.4  C) Oral (!) 47 17 96 % 1.676 m (5' 6\") 78.9 kg (174 lb)       PHYSICAL EXAM    Constitutional:  Well developed, Well nourished,  HENT:  Normocephalic, Atraumatic, Bilateral external ears normal, Oropharynx moist, Nose normal.   Neck:  Normal range of motion, No meningismus, No stridor.   Eyes:  EOMI, Conjunctiva normal, No discharge.   Respiratory:  Normal breath sounds, No respiratory distress, No wheezing, No chest tenderness.   Cardiovascular: Bradycardic, Normal rhythm, No murmurs  GI:  Soft, No tenderness, No guarding, No CVA tenderness.   Musculoskeletal:  Neurovascularly intact distally, No edema, No tenderness, No cyanosis, Good range of motion in all major joints. No tenderness to palpation or major deformities noted.   Integument:  Warm, Dry, No erythema, No rash.   Lymphatic:  No lymphadenopathy noted.   Neurologic:  Alert & oriented x 3, Normal motor function, Normal sensory function, No focal deficits noted.   Psychiatric:  Affect normal, Judgment normal, Mood normal.      LAB:  All pertinent labs reviewed and interpreted.  Results for orders placed or performed during the hospital encounter of 08/22/21   CBC with platelets   Result Value Ref Range    WBC Count 11.4 (H) 4.0 - 11.0 10e3/uL    RBC Count 4.68 4.40 - 5.90 10e6/uL    Hemoglobin 14.2 13.3 - 17.7 g/dL    Hematocrit 43.5 40.0 - 53.0 %    MCV 93 78 - 100 fL    MCH 30.3 26.5 - 33.0 pg    MCHC 32.6 31.5 - 36.5 g/dL    RDW 12.3 10.0 - 15.0 %    Platelet Count 232 150 - 450 10e3/uL   Basic metabolic panel   Result Value Ref Range    Sodium 136 136 - 145 mmol/L    Potassium 3.9 3.5 - 5.0 mmol/L    Chloride 103 98 - 107 mmol/L    Carbon Dioxide (CO2) 22 22 - 31 mmol/L    Anion Gap 11 5 - 18 mmol/L    Urea Nitrogen 16 8 - 28 mg/dL    Creatinine 0.88 0.70 - 1.30 mg/dL    Calcium 8.9 8.5 - 10.5 mg/dL    Glucose 117 70 - 125 mg/dL    GFR Estimate 81 >60 mL/min/1.73m2   Result Value Ref Range    Troponin I 0.01 0.00 - 0.29 ng/mL   Extra Red " Top Tube   Result Value Ref Range    Hold Specimen JIC          EKG:    Sinus bradycardia at 51 bpm.  Normal axis.  No signs of acute ischemia.  QRS 98 ms,  ms.    I have independently reviewed and interpreted this EKG          I, Flavio Ahumada, am serving as a scribe to document services personally performed by Dr. Peralta based on my observation and the provider's statements to me. I, Gonzalo Peralta, DO attest that Flavio Ahumada is acting in a scribe capacity, has observed my performance of the services and has documented them in accordance with my direction.    Gonzalo Peralta DO  Emergency Medicine  [facility] EMERGENCY DEPARTMENT  [facility] [facility] [address] [phone]  Dept: [phone]       Gonzalo Peralta MD  08/22/21 [id number]

## 2021-08-22 NOTE — PHARMACY-ADMISSION MEDICATION HISTORY
Pharmacy Note - Admission Medication History    Pertinent Provider Information: none     ______________________________________________________________________    Prior To Admission (PTA) med list completed and updated in EMR.       PTA Med List   Medication Sig Last Dose     aspirin 81 MG EC tablet [ASPIRIN 81 MG EC TABLET] Take 81 mg by mouth daily. 8/22/2021 at Unknown time     atorvastatin (LIPITOR) 20 MG tablet [ATORVASTATIN (LIPITOR) 20 MG TABLET] Take 1 tablet (20 mg total) by mouth daily. (Patient taking differently: Take 20 mg by mouth At Bedtime ) 8/21/2021 at Unknown time     cholecalciferol, vitamin D3, 1,000 unit tablet [CHOLECALCIFEROL, VITAMIN D3, 1,000 UNIT TABLET] Take 1,000 Units by mouth daily. 8/22/2021 at Unknown time     famotidine (PEPCID) 20 MG tablet Take 20 mg by mouth 2 times daily as needed Past Week at Unknown time     lisinopriL-hydrochlorothiazide (PRINZIDE,ZESTORETIC) 10-12.5 mg per tablet [LISINOPRIL-HYDROCHLOROTHIAZIDE (PRINZIDE,ZESTORETIC) 10-12.5 MG PER TABLET] Take 1 tablet by mouth daily. for blood pressure 8/22/2021 at Unknown time     naproxen sodium (ANAPROX) 220 MG tablet Take 220 mg by mouth 2 times daily as needed for moderate pain Past Week at Unknown time     sildenafil (REVATIO) 20 mg tablet [SILDENAFIL (REVATIO) 20 MG TABLET] Take 3 tablets (60 mg total) by mouth daily as needed.      tamsulosin (FLOMAX) 0.4 mg cap [TAMSULOSIN (FLOMAX) 0.4 MG CAP] Take 1 capsule (0.4 mg total) by mouth Daily after breakfast. 8/22/2021 at Unknown time       Information source(s): Patient, Family member and CareEverywhere/SureScripts  Method of interview communication: in-person    Summary of Changes to PTA Med List  New: none  Discontinued: amlodipine  Changed: none    Patient was asked about OTC/herbal products specifically.  PTA med list reflects this.    In the past week, patient estimated taking medication this percent of the time:  greater than 90%.    Allergies were reviewed,  assessed, and updated with the patient.      Patient did not bring any medications to the hospital and can't retrieve from home. No multi-dose medications are available for use during hospital stay.     The information provided in this note is only as accurate as the sources available at the time of the update(s).    Thank you for the opportunity to participate in the care of this patient.    Maycol Mahoney [facility]  8/22/2021 3:17 PM

## 2021-08-22 NOTE — ED TRIAGE NOTES
Pt brought by EMS from home for evaluation of near syncope and bradycardia.   Pt was doing yard work today, watering his trees when at 1045 he suddenly became weak, lightheaded, diaphoretic and pale with vomiting x 2. Pt went to find his wife, laid down and placed an ice pack on his head. EMS later called and on arrival pt was still laying down, diaphoretic and pale with a pulse of 40. Initial /60, blood glucose 142.   Pt was assisted to stand up to get to stretcher and he was very weak and vomited x 1. IV established and 4mg zofran given.  EKG showed sinus chloé for EMS, HR 45-50.

## 2021-08-22 NOTE — H&P
Admission History and Physical   Go Benton,  1940, MRN [id number]    [facility]    PCP: Asim Howard, [phone]          Extended Emergency Contact Information  Primary Emergency Contact: Holly Benton   [place]  Mobile Phone: [phone]  Relation: Spouse       Assessment and Plan     81M with chronic sinus bradycardia, HTN who presents with an episode of near syncope.  Found to be bradycardic in 40s which is lower than usual (low 50s per prior EKGs).  Question if vagal event vs. an episode of symptomatic bradycardia.      #near syncope - tele, serial trops, ECHO, orthostatics.  Cardiology consult.  Consider cardiac event monitor if not capturing more significant bradycardia/block/pauses overnight.      #HTN - lisinopril.  Hold hydrochlorothiazide until check orthostatics.    #Likely mild stress leukocytosis.  No infectious symptoms.  Repeat in AM.    Checklist:  Code Status: Full  Diet:  regular  Winslow Catheter: Not present  Central Lines: None  DVT px:  Low Risk/Ambulatory with no VTE prophylaxis indicated        Advanced Care Planning  I anticipate the patient will be admitted to the hospital for at least 2 midnights for the evaluation and treatment of the conditions discussed above.     Chief Complaint: Near syncope     HPI:    Go Benton is a 81 year old old male HTN who presents with a near syncopal episode.  Mr. Benton was in his usual state of health when he went to water trees outside.  He was outside for about 1 hour and was not really exerting himself.  He came back inside and while sitting on the cough he started to feel lightheaded and nauseated.  His wife noted that he was diaphoretic and pale.  He went to the bathroom and laid on the floor and vomited x 2.  After short while he started to feel better and was able to walk to a chair.  EMS was called and when he stood up he felt weak and diaphoretic again.  EKG with EMS  "demonstrated sinus bradycardia in the low 40s.146/60, blood glucose 142.        Currently feels fine.  No CP, SOB, loss of consciousness, focal neurologic symptoms.    No prior hx of syncope though did have an episode of dizziness evaluated in 2012 which was diagnosed as vertigo.    Of note Mrs. Benton has noted several episodes over the last few months where Mr. Benton will feel weak which will quickly resolve.        History is provided by patient and wife       Physical Exam:  Temp:  [97.6  F (36.4  C)] 97.6  F (36.4  C)  Pulse:  [47-53] 53  Resp:  [17-18] 17  BP: (117-134)/(57-65) 118/60  SpO2:  [93 %-96 %] 94 %  /60   Pulse 53   Temp 97.6  F (36.4  C) (Oral)   Resp 17   Ht 1.676 m (5' 6\")   Wt 78.9 kg (174 lb)   SpO2 94%   BMI 28.08 kg/m       General:  Alert, cooperative, no distress,  Appears stated age  Neurologic:  oriented, facial symmetry preserved, fluent speech. Extremity strength and sensation full and intact  Psych: calm, mood and affect appropriate to situation  HEENT:  Anicteric, MMM, unremarkable dentition  CV: RRR no MRG, normal S1 and S2, no edema  Lungs: CTAB.  Easyrespirations  Abd: soft, NT, normoactive BS  Skin: no rashes noted on exposed skin. Color and turgor normal  Central Lines and Tubes: None (no bhatia, CVC, feeding tubes)         Pertinent Test Findings  Radiology Results (results reviewed):   No results found for this visit on 08/22/21.    EKG (personally reviewed):  Sinus bradycardia,  no acute ischemic changes      Medical History  Past Medical History:   Diagnosis Date     Cataract 6/20/2018     Cataract 6/20/2018     ED (erectile dysfunction) 1/24/2017     Essential hypertension     Created by Conversion  Replacement Utility updated for latest IMO load     Hypercholesteremia     Created by Conversion      Hypertension      Hypovitaminosis D 7/29/2016     Skin cancer of nose 2019        Surgical History  Past Surgical History:   Procedure Laterality Date     " APPENDECTOMY       CATARACT EXTRACTION            Social History  Social History     Tobacco Use     Smoking status: Never Smoker     Smokeless tobacco: Never Used   Substance Use Topics     Alcohol use: Yes     Alcohol/week: 14.0 standard drinks     Drug use: Never          Allergies  No Known Allergies Family History  I have reviewed this patient's family history and updated it with pertinent information if needed.  Family History   Problem Relation Age of Onset     Heart Disease Mother          of a heart attack.     Hypertension Mother      Cerebrovascular Disease Mother      Heart Disease Father         Found dead - therefore likely heart.     Diabetes Father      Hypertension Father      No Known Problems Sister      No Known Problems Daughter      Multiple Sclerosis Sister      Brain Cancer Sister      No Known Problems Daughter      No Known Problems Maternal Grandmother      No Known Problems Maternal Grandfather      Heart Disease Paternal Grandmother      Heart Disease Paternal Grandfather                Prior to Admission Medications   Prior to Admission Medications   Prescriptions Last Dose Informant Patient Reported? Taking?   aspirin 81 MG EC tablet 2021 at Unknown time  Yes Yes   Sig: [ASPIRIN 81 MG EC TABLET] Take 81 mg by mouth daily.   atorvastatin (LIPITOR) 20 MG tablet 2021 at Unknown time  No Yes   Sig: [ATORVASTATIN (LIPITOR) 20 MG TABLET] Take 1 tablet (20 mg total) by mouth daily.   Patient taking differently: Take 20 mg by mouth At Bedtime    cholecalciferol, vitamin D3, 1,000 unit tablet 2021 at Unknown time  Yes Yes   Sig: [CHOLECALCIFEROL, VITAMIN D3, 1,000 UNIT TABLET] Take 1,000 Units by mouth daily.   famotidine (PEPCID) 20 MG tablet Past Week at Unknown time  Yes Yes   Sig: Take 20 mg by mouth 2 times daily as needed   lisinopriL-hydrochlorothiazide (PRINZIDE,ZESTORETIC) 10-12.5 mg per tablet 2021 at Unknown time  No Yes   Sig: [LISINOPRIL-HYDROCHLOROTHIAZIDE  (PRINZIDE,ZESTORETIC) 10-12.5 MG PER TABLET] Take 1 tablet by mouth daily. for blood pressure   naproxen sodium (ANAPROX) 220 MG tablet Past Week at Unknown time  Yes Yes   Sig: Take 220 mg by mouth 2 times daily as needed for moderate pain   sildenafil (REVATIO) 20 mg tablet   No Yes   Sig: [SILDENAFIL (REVATIO) 20 MG TABLET] Take 3 tablets (60 mg total) by mouth daily as needed.   tamsulosin (FLOMAX) 0.4 mg cap 8/22/2021 at Unknown time  No Yes   Sig: [TAMSULOSIN (FLOMAX) 0.4 MG CAP] Take 1 capsule (0.4 mg total) by mouth Daily after breakfast.      Facility-Administered Medications: None          Review of Systems:    10point review of systems negative except as listed in HPI      Pertinent Labs  Lab Results: personally reviewed.     Most Recent 3 CBC's:  Recent Labs   Lab Test 08/22/21  1311 06/20/18  1115   WBC 11.4* 5.7   HGB 14.2 15.4   MCV 93 91    219     Most Recent 3 BMP's:  Recent Labs   Lab Test 08/22/21  1311 08/07/20  0901 07/01/19  0903    138 138   POTASSIUM 3.9 4.8 4.2   CHLORIDE 103 102 101   CO2 22 25 26   BUN 16 13 15   CR 0.88 0.93 0.99   ANIONGAP 11 11 11   EMMA 8.9 9.5 9.5    95 104     Most Recent 2 LFT's:  Recent Labs   Lab Test 08/07/20  0901 07/01/19  0903   AST 21 26   ALT 26 32   ALKPHOS 55 64   BILITOTAL 0.6 0.8     Most Recent 3 INR's:No lab results found.  Most Recent 3 Troponin's:No lab results found.    Susie Benedict MD  Internal Medicine Hospitalist  8/22/2021  3:21 PM

## 2021-08-23 ENCOUNTER — HOSPITAL ENCOUNTER (OUTPATIENT)
Dept: CARDIOLOGY | Facility: HOSPITAL | Age: 81
Setting detail: OBSERVATION
End: 2021-08-23
Attending: FAMILY MEDICINE
Payer: MEDICARE

## 2021-08-23 ENCOUNTER — APPOINTMENT (OUTPATIENT)
Dept: CARDIOLOGY | Facility: HOSPITAL | Age: 81
End: 2021-08-23
Attending: HOSPITALIST
Payer: MEDICARE

## 2021-08-23 VITALS
HEIGHT: 66 IN | BODY MASS INDEX: 28.99 KG/M2 | TEMPERATURE: 98.5 F | WEIGHT: 180.4 LBS | OXYGEN SATURATION: 93 % | RESPIRATION RATE: 18 BRPM | HEART RATE: 52 BPM | DIASTOLIC BLOOD PRESSURE: 67 MMHG | SYSTOLIC BLOOD PRESSURE: 137 MMHG

## 2021-08-23 DIAGNOSIS — N52.9 ED (ERECTILE DYSFUNCTION): ICD-10-CM

## 2021-08-23 DIAGNOSIS — E78.00 HYPERCHOLESTEREMIA: ICD-10-CM

## 2021-08-23 DIAGNOSIS — I10 ESSENTIAL HYPERTENSION: ICD-10-CM

## 2021-08-23 DIAGNOSIS — N52.9 ERECTILE DYSFUNCTION, UNSPECIFIED ERECTILE DYSFUNCTION TYPE: Primary | ICD-10-CM

## 2021-08-23 DIAGNOSIS — E78.5 HYPERLIPIDEMIA: ICD-10-CM

## 2021-08-23 PROBLEM — D72.829 LEUKOCYTOSIS: Status: ACTIVE | Noted: 2021-08-23

## 2021-08-23 PROBLEM — N40.0 BPH (BENIGN PROSTATIC HYPERPLASIA): Status: ACTIVE | Noted: 2021-05-03

## 2021-08-23 LAB
ANION GAP SERPL CALCULATED.3IONS-SCNC: 12 MMOL/L (ref 5–18)
ATRIAL RATE - MUSE: 51 BPM
BUN SERPL-MCNC: 12 MG/DL (ref 8–28)
CALCIUM SERPL-MCNC: 8.6 MG/DL (ref 8.5–10.5)
CHLORIDE BLD-SCNC: 102 MMOL/L (ref 98–107)
CO2 SERPL-SCNC: 23 MMOL/L (ref 22–31)
CREAT SERPL-MCNC: 0.87 MG/DL (ref 0.7–1.3)
DIASTOLIC BLOOD PRESSURE - MUSE: NORMAL MMHG
ERYTHROCYTE [DISTWIDTH] IN BLOOD BY AUTOMATED COUNT: 12.5 % (ref 10–15)
GFR SERPL CREATININE-BSD FRML MDRD: 82 ML/MIN/1.73M2
GLUCOSE BLD-MCNC: 110 MG/DL (ref 70–125)
HCT VFR BLD AUTO: 42.2 % (ref 40–53)
HGB BLD-MCNC: 13.8 G/DL (ref 13.3–17.7)
INTERPRETATION ECG - MUSE: NORMAL
LVEF ECHO: NORMAL
MAGNESIUM SERPL-MCNC: 2 MG/DL (ref 1.8–2.6)
MCH RBC QN AUTO: 30.5 PG (ref 26.5–33)
MCHC RBC AUTO-ENTMCNC: 32.7 G/DL (ref 31.5–36.5)
MCV RBC AUTO: 93 FL (ref 78–100)
P AXIS - MUSE: 52 DEGREES
PLATELET # BLD AUTO: 236 10E3/UL (ref 150–450)
POTASSIUM BLD-SCNC: 4.1 MMOL/L (ref 3.5–5)
PR INTERVAL - MUSE: 178 MS
QRS DURATION - MUSE: 98 MS
QT - MUSE: 468 MS
QTC - MUSE: 431 MS
R AXIS - MUSE: 40 DEGREES
RBC # BLD AUTO: 4.52 10E6/UL (ref 4.4–5.9)
SODIUM SERPL-SCNC: 137 MMOL/L (ref 136–145)
SYSTOLIC BLOOD PRESSURE - MUSE: NORMAL MMHG
T AXIS - MUSE: 42 DEGREES
TROPONIN I SERPL-MCNC: <0.01 NG/ML (ref 0–0.29)
VENTRICULAR RATE- MUSE: 51 BPM
WBC # BLD AUTO: 7.4 10E3/UL (ref 4–11)

## 2021-08-23 PROCEDURE — 99217 PR OBSERVATION CARE DISCHARGE: CPT | Performed by: FAMILY MEDICINE

## 2021-08-23 PROCEDURE — 250N000013 HC RX MED GY IP 250 OP 250 PS 637: Performed by: HOSPITALIST

## 2021-08-23 PROCEDURE — G0378 HOSPITAL OBSERVATION PER HR: HCPCS

## 2021-08-23 PROCEDURE — 80048 BASIC METABOLIC PNL TOTAL CA: CPT | Performed by: HOSPITALIST

## 2021-08-23 PROCEDURE — 93306 TTE W/DOPPLER COMPLETE: CPT | Mod: 26 | Performed by: INTERNAL MEDICINE

## 2021-08-23 PROCEDURE — 83735 ASSAY OF MAGNESIUM: CPT | Performed by: HOSPITALIST

## 2021-08-23 PROCEDURE — 85027 COMPLETE CBC AUTOMATED: CPT | Performed by: HOSPITALIST

## 2021-08-23 PROCEDURE — 36415 COLL VENOUS BLD VENIPUNCTURE: CPT | Performed by: HOSPITALIST

## 2021-08-23 PROCEDURE — 93306 TTE W/DOPPLER COMPLETE: CPT

## 2021-08-23 PROCEDURE — 93228 REMOTE 30 DAY ECG REV/REPORT: CPT | Performed by: INTERNAL MEDICINE

## 2021-08-23 PROCEDURE — 93270 REMOTE 30 DAY ECG REV/REPORT: CPT

## 2021-08-23 PROCEDURE — 99204 OFFICE O/P NEW MOD 45 MIN: CPT | Performed by: INTERNAL MEDICINE

## 2021-08-23 RX ORDER — SILDENAFIL CITRATE 20 MG/1
60 TABLET ORAL DAILY PRN
Qty: 50 TABLET | Refills: 5 | Status: SHIPPED | OUTPATIENT
Start: 2021-08-23 | End: 2021-10-11

## 2021-08-23 RX ORDER — ATORVASTATIN CALCIUM 20 MG/1
20 TABLET, FILM COATED ORAL AT BEDTIME
Qty: 90 TABLET | Refills: 3 | Status: SHIPPED | OUTPATIENT
Start: 2021-08-23 | End: 2022-08-20

## 2021-08-23 RX ORDER — LISINOPRIL/HYDROCHLOROTHIAZIDE 10-12.5 MG
1 TABLET ORAL DAILY
Qty: 90 TABLET | Refills: 2 | Status: SHIPPED | OUTPATIENT
Start: 2021-08-23 | End: 2021-09-17

## 2021-08-23 RX ADMIN — LISINOPRIL 10 MG: 5 TABLET ORAL at 08:12

## 2021-08-23 RX ADMIN — ASPIRIN 81 MG: 81 TABLET, COATED ORAL at 08:12

## 2021-08-23 RX ADMIN — TAMSULOSIN HYDROCHLORIDE 0.4 MG: 0.4 CAPSULE ORAL at 08:12

## 2021-08-23 ASSESSMENT — MIFFLIN-ST. JEOR: SCORE: 1471.04

## 2021-08-23 ASSESSMENT — ACTIVITIES OF DAILY LIVING (ADL): DEPENDENT_IADLS:: INDEPENDENT

## 2021-08-23 NOTE — PROGRESS NOTES
PRIMARY DIAGNOSIS: SYNCOPE/TIA  OUTPATIENT/OBSERVATION GOALS TO BE MET BEFORE DISCHARGE:  1. Orthostatic performed: Yes:                    Sitting Orthostatic BP: 143/66         Standing Orthostatic BP: 145/71     2. Diagnostic testing complete & at baseline neurologic testing: No, serial trops and echo not yet completed.    3. Cleared by consultants (if involved): No    4. Interpretation of cardiac rhythm per telemetry tech: Sinus chloé, HR 40-50's    5. Tolerating adequate PO diet and medications: Yes    6. Return to near baseline physical activity or neurologic status: Yes    Discharge Planner Nurse   Safe discharge environment identified: Yes  Barriers to discharge: Yes, work-up not yet completed       Entered by: Inessa Mace 08/23/2021 4:57 AM     Please review provider order for any additional goals.   Nurse to notify provider when observation goals have been met and patient is ready for discharge.

## 2021-08-23 NOTE — TELEPHONE ENCOUNTER
Reason for Call:  Patient is calling to request refills    atorvastatin (LIPITOR) tablet 20 mg     lisinopriL-hydrochlorothiazide (PRINZIDE,ZESTORETIC) 10-12.5 mg per tablet    SEND TO [facility]    Detailed comments: last seen  05/2021    Phone Number Patient can be reached at: Home number on file [phone] (home)    Best Time: any    Can we leave a detailed message on this number? YES    Call taken on 8/23/2021 at 1:57 PM by Claudia Del Real

## 2021-08-23 NOTE — CONSULTS
Cardiology Consult Note    Thank you, Dr. Benedict, for asking the [facility] team to see Go Benton in consultation at [facility] to evaluate near syncope.      Assessment:   1.  Near syncope, possibly due to vasovagal event as a result of vertigo.  He describes dizziness although states it was not spinning but also states it was not a lightheaded feeling.  It was associated with nausea and vomiting which would be consistent with vertigo.  When found, heart rate was quite low but in a sinus rhythm which again could be consistent with vasovagal phenomenon.  No evidence of any significant arrhythmias overnight i.e. pauses or AV block.  Marked sinus bradycardia could be due to enhanced vagal tone while asleep.  At this point, if echocardiogram is normal, feel he could be discharged home with a 1 week outpatient event monitor and then follow-up with me in the clinic.  2.  Essential hypertension, well controlled     Plan:   1.  Resume outpatient medication  2.  We will review echocardiogram  3.  If no abnormality identified, patient could be discharged home with 1 week event monitor and subsequent follow-up with me     Current History:   Mr. Go Benton is a 81 year old male with history of essential hypertension and vertigo diagnosed 9 years ago who was in his usual state of health eye watering trees and came back inside.  Shortly thereafter, he began to feel lightheaded/dizzy and nauseated.  He also appeared diaphoretic and pale.  He went to the bathroom and laid on the floor where he vomited x2.  No associated chest discomfort.  Was feeling somewhat better and able to walk to a chair where again he appeared weak and diaphoretic.  Paramedics were summoned and he was found to be in a sinus bradycardia in the low 40s but with stable blood pressure and blood glucose.  No ST elevation identified.  He was subsequently brought to the ED where his work-up was unremarkable with  the exception of the sinus bradycardia mildly elevated white count.  Now admitted for further evaluation.  Denies any history of exertional chest discomfort or dyspnea.  No prior cardiac history outside of his hypertension.    Past Medical History:     Past Medical History:   Diagnosis Date     Cataract 2018     Cataract 2018     ED (erectile dysfunction) 2017     Essential hypertension     Created by Conversion  Replacement Utility updated for latest IMO load     Hypercholesteremia     Created by Conversion      Hypertension      Hypovitaminosis D 2016     Skin cancer of nose 2019       Past Surgical History:     Past Surgical History:   Procedure Laterality Date     APPENDECTOMY       CATARACT EXTRACTION         Family History:     Family History   Problem Relation Age of Onset     Heart Disease Mother 65         of a heart attack.     Hypertension Mother      Cerebrovascular Disease Mother 43     Heart Disease Father         Found dead - therefore likely heart.     Diabetes Father      Hypertension Father      No Known Problems Sister      No Known Problems Daughter      Multiple Sclerosis Sister      Brain Cancer Sister      No Known Problems Daughter      No Known Problems Maternal Grandmother      No Known Problems Maternal Grandfather      Heart Disease Paternal Grandmother      Heart Disease Paternal Grandfather        Social History:    reports that he has never smoked. He has never used smokeless tobacco. He reports current alcohol use of about 14.0 standard drinks of alcohol per week. He reports that he does not use drugs.    Meds:     Current Facility-Administered Medications:      acetaminophen (TYLENOL) tablet 650 mg, 650 mg, Oral, Q6H PRN **OR** acetaminophen (TYLENOL) Suppository 650 mg, 650 mg, Rectal, Q6H PRN, Susie Benedict MD     alum & mag hydroxide-simethicone (MAALOX) suspension 30 mL, 30 mL, Oral, Q4H PRN, Susie Benedict MD     aspirin EC tablet 81 mg, 81 mg, Oral,  Daily, Susie Benedict MD, 81 mg at 08/23/21 0812     atorvastatin (LIPITOR) tablet 20 mg, 20 mg, Oral, Daily, Susie Benedict MD, 20 mg at 08/22/21 2031     famotidine (PEPCID) tablet 20 mg, 20 mg, Oral, BID PRN, Susie Benedict MD     lidocaine (LMX4) cream, , Topical, Q1H PRN, Susie Benedict MD     lidocaine 1 % 0.1-1 mL, 0.1-1 mL, Other, Q1H PRN, Susie Benedict MD     lisinopril (ZESTRIL) tablet 10 mg, 10 mg, Oral, Daily, Susie Benedict MD, 10 mg at 08/23/21 0812     melatonin tablet 1 mg, 1 mg, Oral, At Bedtime PRN, Susie Benedict MD     ondansetron (ZOFRAN-ODT) ODT tab 4 mg, 4 mg, Oral, Q6H PRN **OR** ondansetron (ZOFRAN) injection 4 mg, 4 mg, Intravenous, Q6H PRN, Susie Benedict MD     prochlorperazine (COMPAZINE) injection 5 mg, 5 mg, Intravenous, Q6H PRN **OR** prochlorperazine (COMPAZINE) tablet 5 mg, 5 mg, Oral, Q6H PRN **OR** prochlorperazine (COMPAZINE) suppository 12.5 mg, 12.5 mg, Rectal, Q12H PRN, Susie Benedict MD     senna-docusate (SENOKOT-S/PERICOLACE) 8.6-50 MG per tablet 1 tablet, 1 tablet, Oral, BID PRN **OR** senna-docusate (SENOKOT-S/PERICOLACE) 8.6-50 MG per tablet 2 tablet, 2 tablet, Oral, BID PRN, Susie Benedict MD     sodium chloride (PF) 0.9% PF flush 3 mL, 3 mL, Intracatheter, Q8H, Susie Benedict MD, 3 mL at 08/23/21 0812     sodium chloride (PF) 0.9% PF flush 3 mL, 3 mL, Intracatheter, q1 min prn, Susie Benedict MD     tamsulosin (FLOMAX) capsule 0.4 mg, 0.4 mg, Oral, Daily with breakfast, Susie Benedict MD, 0.4 mg at 08/23/21 0812    aspirin  81 mg Oral Daily     atorvastatin  20 mg Oral Daily     lisinopril  10 mg Oral Daily     sodium chloride (PF)  3 mL Intracatheter Q8H     tamsulosin  0.4 mg Oral Daily with breakfast       Allergies:   Patient has no known allergies.    Review of Systems:   A 12 point comprehensive review of systems was negative except as noted in the current history.    Objective:      Physical Exam  [unfilled]  [unfilled]  Body mass index is 29.12 kg/m .  BP  "137/67 (BP Location: Right arm)   Pulse 52   Temp 98.5  F (36.9  C) (Oral)   Resp 18   Ht 1.676 m (5' 6\")   Wt 81.8 kg (180 lb 6.4 oz)   SpO2 93%   BMI 29.12 kg/m      General Appearance:    Well-developed, well-nourished elderly male who appears in no acute distress   HEENT:   Normocephalic, atraumatic.  Sclera nonicteric.  Mucous membranes moist.   Neck:  No jugular venous distention or carotid bruits   Chest:  Symmetric with normal AP diameter   Lungs:    Clear to auscultation and percussion bilaterally   Cardiovascular:    Regular rhythm which is mildly bradycardic.  S1, S2 normal.  No murmur gallop   Abdomen:   Obese, soft, nondistended, nontender.  No organomegaly or mass.   Extremities:  No peripheral edema, clubbing or cyanosis.  Peripheral pulses are symmetric.   Skin:  No rash or lesions   Neurologic:  Alert and oriented x3.  No gross focal deficits.               Cardiographics (personally reviewed)  ECG on admission demonstrates sinus bradycardia with occasional PAC.  No acute ischemic changes.  No change from prior ECG other than the PAC    Imaging (personally reviewed)  No new imaging.  Echocardiogram currently pending.    Lab Review (personally reviewed all results)  Lab Results   Component Value Date     08/23/2021    CO2 23 08/23/2021    BUN 12 08/23/2021     Lab Results   Component Value Date    WBC 7.4 08/23/2021    HGB 13.8 08/23/2021    HCT 42.2 08/23/2021    MCV 93 08/23/2021     08/23/2021     Lab Results   Component Value Date    CHOL 176 08/07/2020    TRIG 117 08/07/2020    HDL 60 08/07/2020     Troponin I   Date Value Ref Range Status   08/22/2021 <0.01 0.00 - 0.29 ng/mL Final   08/22/2021 0.01 0.00 - 0.29 ng/mL Final   08/22/2021 0.01 0.00 - 0.29 ng/mL Final     No results found for: BNP           "

## 2021-08-23 NOTE — PLAN OF CARE
Patient received discharge orders.  Patient was fitted with an event monitor by cardiology.   AVS reviewed with patient and spouse.  Patient brought out by wheelchair.

## 2021-08-23 NOTE — CONSULTS
Care Management Initial Consult    General Information  Assessment completed with: Patient, Spouse or significant other, patient and spouse  Type of CM/SW Visit: Initial Assessment    Primary Care Provider verified and updated as needed: Yes   Readmission within the last 30 days: no previous admission in last 30 days      Reason for Consult: discharge planning  Advance Care Planning: Advance Care Planning Reviewed: verified with patient   (Has healthcare directive at home. Copy requested from spouse)     Communication Assessment  Patient's communication style: spoken language (English or Bilingual)    Hearing Difficulty or Deaf: no   Wear Glasses: yes    Cognitive  Cognitive/Neuro/Behavioral: WDL                      Living Environment:   People in home: spouse  pt and spouse, Holly  Current living Arrangements: house      Able to return to prior arrangements: yes     Family/Social Support:  Care provided by: self  Provides care for: no one     Wife  Holly       Description of Support System: Supportive, Involved    Support Assessment: Adequate family and caregiver support    Current Resources:   Patient receiving home care services: No     Community Resources: None  Equipment currently used at home: none  Supplies currently used at home: None    Employment/Financial:  Employment Status: retired        Financial Concerns: No concerns identified   Referral to Financial Counselor: No     Lifestyle & Psychosocial Needs:  Social Determinants of Health     Tobacco Use: Low Risk      Smoking Tobacco Use: Never Smoker     Smokeless Tobacco Use: Never Used   Alcohol Use:      Frequency of Alcohol Consumption:      Average Number of Drinks:      Frequency of Binge Drinking:    Financial Resource Strain:      Difficulty of Paying Living Expenses:    Food Insecurity:      Worried About Running Out of Food in the Last Year:      Ran Out of Food in the Last Year:    Transportation Needs:      Lack of Transportation (Medical):       Lack of Transportation (Non-Medical):    Physical Activity:      Days of Exercise per Week:      Minutes of Exercise per Session:    Stress:      Feeling of Stress :    Social Connections:      Frequency of Communication with Friends and Family:      Frequency of Social Gatherings with Friends and Family:      Attends Yazidi Services:      Active Member of Clubs or Organizations:      Attends Club or Organization Meetings:      Marital Status:    Intimate Partner Violence:      Fear of Current or Ex-Partner:      Emotionally Abused:      Physically Abused:      Sexually Abused:    Depression: Not at risk     PHQ-2 Score: 0   Housing Stability:      Unable to Pay for Housing in the Last Year:      Number of Places Lived in the Last Year:      Unstable Housing in the Last Year:      Functional Status:  Prior to admission patient needed assistance:   Dependent ADLs:: Independent  Dependent IADLs:: Independent  Assesssment of Functional Status: At functional baseline    Mental Health Status:  Mental Health Status: No Current Concerns       Chemical Dependency Status:  Chemical Dependency Status: No Current Concerns           Values/Beliefs:  Spiritual, Cultural Beliefs, Yazidi Practices, Values that affect care: no             Additional Information:  Met with patient and spouse, Holly, to review role of care management, progression of care and possible need for services at discharge, including OP services, home care, or skilled nursing care.   Pt states he lives with wife and is independent with ADLs. He plays golf twice per week and walks several times per week.   He states he is eager to return home and does not feel need for any services.  Spouse states she will transport pt home.  JASON Cary RN

## 2021-08-23 NOTE — PLAN OF CARE
PRIMARY DIAGNOSIS: SYNCOPE/TIA  OUTPATIENT/OBSERVATION GOALS TO BE MET BEFORE DISCHARGE:  1. Orthostatic performed: Yes:                    Sitting Orthostatic BP: 143/66         Standing Orthostatic BP: 145/71     2. Diagnostic testing complete & at baseline neurologic testing: No    3. Cleared by consultants (if involved): No    4. Interpretation of cardiac rhythm per telemetry tech: Sinus Bradycardia, HR 57bpm    5. Tolerating adequate PO diet and medications: Yes    6. Return to near baseline physical activity or neurologic status: Yes    Discharge Planner Nurse   Safe discharge environment identified: No  Barriers to discharge: Yes, here for observation.        Entered by: Jayjay Nguyen 08/22/2021 7:53 PM     Please review provider order for any additional goals.   Nurse to notify provider when observation goals have been met and patient is ready for discharge.

## 2021-08-23 NOTE — PLAN OF CARE
Problem: Syncope  Goal: Absence of Syncopal Symptoms  Outcome: Improving     Problem: Nausea and Vomiting  Goal: Fluid and Electrolyte Balance  Outcome: Improving       Asymptomatic bradycardia, nausea resolved, no acute events overnight

## 2021-08-23 NOTE — DISCHARGE SUMMARY
[facility] MEDICINE  DISCHARGE SUMMARY  [facility]     Primary Care Physician: Asim Howard  Admission Date: 8/22/2021   Discharge Provider: Grace Meredith DO Discharge Date: 8/23/2021   Diet: cardiac   Code Status: Full Code   Activity: as tolerated        Condition at Discharge: stable      REASON FOR PRESENTATION(See Admission Note for Details)   81-year-old male with HTN, HLD presents after near syncopal episode with marked bradycardia.    PRINCIPAL & ACTIVE DISCHARGE DIAGNOSES     Active Problems:    HLD (hyperlipidemia)    Essential hypertension    BPH (benign prostatic hyperplasia)    Near syncope    Sinus bradycardia    Leukocytosis      SIGNIFICANT FINDINGS (Imaging, labs):   Interpretation Summary       Left ventricular function is normal.The ejection fraction is 60-65%.   No significant valve abnormalities.   Normal right ventricle size and systolic function.     PENDING LABS     [unfilled]      PROCEDURES ( this hospitalization only)          RECOMMENDATIONS TO OUTPATIENT PROVIDER FOR F/U VISIT     Patient discharged with 1 week event monitor, will follow up with cardiology outpatient    DISPOSITION     Home    SUMMARY OF HOSPITAL COURSE:      Near syncope, sinu bradycardia  Patient with episode of lightheadedness, emesis, diaphoresis with associated bradycardia but normal blood pressure.  Patient did not appear to be exerting himself at the time so unclear if vasovagal event or bradycardia or cardiac arrhythmias caused.  Troponin negative x3, electrolytes normal, no anemia.  EKG with sinus bradycardia but otherwise normal.  Patient on lisinopril and tamsulosin, blood pressure was normal when checked by EMS but blood pressure changes could also be caused.  TTE without any abnormalities, patient without events on telemetry.  Likely near syncope was vasovagal in nature.  Patient was evaluated by cardiology who did suggest 1 week event monitor which  was placed prior to discharge.     Leukocytosis  Likely secondary to emesis, patient afebrile, without abdominal pain, cough, fevers.  More likely reactive.  Resolved spontaneously     HTN  Lisinopril 10 mg p.o. daily     HLD  Atorvastatin 20 mg p.o. daily, ASA 81 mg p.o. daily     BPH  Tamsulosin 0.8 mg p.o. daily    Discharge Medications with Med changes:        Review of your medicines      CONTINUE these medicines which have NOT CHANGED      Dose / Directions   aspirin 81 MG EC tablet  Commonly known as: ASA      Dose: 81 mg  [ASPIRIN 81 MG EC TABLET] Take 81 mg by mouth daily.  Refills: 0     cholecalciferol 25 MCG (1000 UT) Tabs      Dose: 1,000 Units  [CHOLECALCIFEROL, VITAMIN D3, 1,000 UNIT TABLET] Take 1,000 Units by mouth daily.  Refills: 0     famotidine 20 MG tablet  Commonly known as: PEPCID      Dose: 20 mg  Take 20 mg by mouth 2 times daily as needed  Refills: 0     tamsulosin 0.4 MG capsule  Commonly known as: FLOMAX  Used for: Benign prostatic hyperplasia with incomplete bladder emptying      Dose: 0.4 mg  [TAMSULOSIN (FLOMAX) 0.4 MG CAP] Take 1 capsule (0.4 mg total) by mouth Daily after breakfast.  Quantity: 90 capsule  Refills: 3        STOP taking    naproxen sodium 220 MG tablet  Commonly known as: ANAPROX                   Rationale for medication changes:            Consults   cardiology      Immunizations given this encounter     [unfilled]       Anticoagulation Information      Recent INR results: No results for input(s): INR in the last 168 hours.        Discharge Orders     Discharge Procedure Orders   Reason for your hospital stay   Order Comments: Presyncope and emesis     Follow-up and recommended labs and tests    Order Comments: Follow up with primary care provider, Asim Howard, within 7 days for hospital follow- up.  The following labs/tests are recommended: none.    RN discharge follow up clinic visit ([place] [place] [facility] [place] within 7 days.     Activity   Order  "Comments: Your activity upon discharge: activity as tolerated     Order Specific Question Answer Comments   Is discharge order? Yes      Diet   Order Comments: Follow this diet upon discharge: Orders Placed This Encounter      Combination Diet Regular Diet Adult; 2 gm NA Diet     Order Specific Question Answer Comments   Is discharge order? Yes      Examination     Vital signs:  Temp: 98.5  F (36.9  C) Temp src: Oral BP: 137/67 Pulse: 52   Resp: 18 SpO2: 93 % O2 Device: None (Room air)   Height: 167.6 cm (5' 6\") Weight: 81.8 kg (180 lb 6.4 oz)  Estimated body mass index is 29.12 kg/m  as calculated from the following:    Height as of this encounter: 1.676 m (5' 6\").    Weight as of this encounter: 81.8 kg (180 lb 6.4 oz).         Physical Examination:   General appearance - alert, appears younger than stated age, and in no distress and oriented to person, place, and time  Mental status - alert, oriented to person, place, and time, normal mood, behavior, speech, dress, motor activity, and thought processes  HEENT - sclera anicteric, left eye normal, right eye normal, nares normal and patent, no erythema  Lymphatics - no palpable lymphadenopathy, no hepatosplenomegaly  Respiratory - clear to auscultation, no wheezes, rales or rhonchi, symmetric air entry, no tachypnea, retractions or cyanosis  Cardiac - normal rate, regular rhythm, normal S1, S2, no murmurs, rubs, clicks or gallops, no JVD  Abdomen - soft, nontender, nondistended, no masses or organomegaly no rebound tenderness noted bowel sounds normal, no bladder distension noted  Neurological - alert, oriented, normal speech, no focal findings or movement disorder noted  Musculoskeletal - no joint tenderness, deformity or swelling, full range of motion without pain  Extremities - peripheral pulses normal, no pedal edema, no clubbing or cyanosis, pedal edema  Skin - normal coloration and turgor, no rashes, no suspicious skin lesions noted      Please see EMR for " more detailed significant labs, imaging, consultant notes etc.  Total time spent on discharge: 50 minutes    Grace Meredith DO    [facility]: Ph:[phone]    CC:Asim Howard

## 2021-08-23 NOTE — TELEPHONE ENCOUNTER
Pending Prescriptions:                       Disp   Refills    lisinopril-hydrochlorothiazide (ZESTORETI*90 tab*2            Sig: Take 1 tablet by mouth daily    atorvastatin (LIPITOR) 20 MG tablet                           Sig: Take 1 tablet (20 mg) by mouth At Bedtime    sildenafil (REVATIO) 20 MG tablet         50 tab*5            Sig: Take 3 tablets (60 mg) by mouth daily as needed    Received additional request from pharmacy for 2 other meds which I added to this request.

## 2021-08-23 NOTE — PLAN OF CARE
Problem: Adult Inpatient Plan of Care  Goal: Plan of Care Review  Outcome: Improving     Goal: Absence of Hospital-Acquired Illness or Injury  Intervention: Identify and Manage Fall Risk    Problem: Syncope  Goal: Absence of Syncopal Symptoms  Outcome: Improving     Problem: Nausea and Vomiting  Goal: Fluid and Electrolyte Balance  Outcome: Improving     Tele-Sinus Bradycardia, HR 40-50's. Denied lightheadedness, no SOB, no N/V. Ate and voiding. Electrolytes acceptable, recheck level in am. Troponin undetected. Orthostatic BP done.

## 2021-09-17 ENCOUNTER — OFFICE VISIT (OUTPATIENT)
Dept: FAMILY MEDICINE | Facility: CLINIC | Age: 81
End: 2021-09-17
Payer: MEDICARE

## 2021-09-17 VITALS
DIASTOLIC BLOOD PRESSURE: 72 MMHG | SYSTOLIC BLOOD PRESSURE: 112 MMHG | RESPIRATION RATE: 12 BRPM | HEIGHT: 66 IN | HEART RATE: 48 BPM | TEMPERATURE: 97.9 F | BODY MASS INDEX: 29.22 KG/M2 | WEIGHT: 181.8 LBS

## 2021-09-17 DIAGNOSIS — I10 ESSENTIAL HYPERTENSION: ICD-10-CM

## 2021-09-17 DIAGNOSIS — R39.14 BENIGN PROSTATIC HYPERPLASIA WITH INCOMPLETE BLADDER EMPTYING: Primary | ICD-10-CM

## 2021-09-17 DIAGNOSIS — N40.1 BENIGN PROSTATIC HYPERPLASIA WITH INCOMPLETE BLADDER EMPTYING: Primary | ICD-10-CM

## 2021-09-17 DIAGNOSIS — N52.9 ERECTILE DYSFUNCTION, UNSPECIFIED ERECTILE DYSFUNCTION TYPE: ICD-10-CM

## 2021-09-17 PROCEDURE — 99214 OFFICE O/P EST MOD 30 MIN: CPT | Performed by: FAMILY MEDICINE

## 2021-09-17 RX ORDER — LISINOPRIL 10 MG/1
10 TABLET ORAL DAILY
Qty: 90 TABLET | Refills: 3 | Status: SHIPPED | OUTPATIENT
Start: 2021-09-17 | End: 2022-09-10

## 2021-09-17 ASSESSMENT — MIFFLIN-ST. JEOR: SCORE: 1477.39

## 2021-09-17 NOTE — PROGRESS NOTES
Problem List Items Addressed This Visit        Circulatory    Essential hypertension     With recent syncopal episode, BP in office, possible dehydration combined with hypotension. Will stop hydrochlorothiazide but continue lisinopril. Follow up in 2-4 weeks for annual physical and BP recheck.         Relevant Medications    lisinopril (ZESTRIL) 10 MG tablet       Urinary    Benign prostatic hyperplasia with incomplete bladder emptying - Primary     Side effects of fatigue and poor endurance affecting quality of life with flomax. Of note, did have decrease in nightly urinations, but side effects outweighed the benefits. Stop flomax and referral to urology to discuss the other options.         Relevant Orders    Adult Urology Referral       Other    ED (erectile dysfunction)    Relevant Orders    Adult Urology Referral          Camelia Rodriguez is a 81 year old who presents for the following health issues   Follow-up from recent emergency room visit followed by Holter monitor.  Holter monitor did show some bradycardia, and he did have some symptoms while on the monitor but those showed some bradycardic events since and normal rate defense and thus did not seem to correlate.  Blood pressure in the hospital's were elevated when he first got there but otherwise still very low.  His home blood pressure staying the same.  He did stop his Flomax about 2 weeks prior to the event starting.  He was having significant fatigue and poor endurance where he could not finish 18 holes of golf while he was on Flomax.  He did decrease his urination to once per night.  But once he stopped the medication his endurance and energy level is improved significantly.  They did stop his amlodipine due to his low blood pressure while in the hospital.  He has not had any events since his discharge from the hospital.  Has been staying up on his fluids a little better than it was prior.  Denies any headache or change in vision or  "hearing.    Review of Systems   All other systems reviewed and are negative.         Objective    /72 (BP Location: Left arm, Patient Position: Sitting, Cuff Size: Adult Large)   Pulse (!) 48   Temp 97.9  F (36.6  C) (Oral)   Resp 12   Ht 1.676 m (5' 6\")   Wt 82.5 kg (181 lb 12.8 oz)   BMI 29.34 kg/m    Body mass index is 29.34 kg/m .  Physical Exam  Vitals and nursing note reviewed.   Constitutional:       General: He is not in acute distress.     Appearance: Normal appearance. He is not ill-appearing.   HENT:      Head: Normocephalic and atraumatic.   Eyes:      Extraocular Movements: Extraocular movements intact.      Conjunctiva/sclera: Conjunctivae normal.   Cardiovascular:      Rate and Rhythm: Normal rate and regular rhythm.      Pulses: Normal pulses.      Heart sounds: Normal heart sounds.   Pulmonary:      Effort: Pulmonary effort is normal.      Breath sounds: Normal breath sounds.   Musculoskeletal:      Right lower leg: No edema.      Left lower leg: No edema.   Skin:     Capillary Refill: Capillary refill takes less than 2 seconds.   Neurological:      Mental Status: He is alert and oriented to person, place, and time.   Psychiatric:         Attention and Perception: Attention normal.         Mood and Affect: Mood normal.         Speech: Speech normal.         Thought Content: Thought content normal.          Post Discharge Medication Reconciliation Status: discharge medications reconciled and changed, per note/orders.                  "

## 2021-09-17 NOTE — ASSESSMENT & PLAN NOTE
Side effects of fatigue and poor endurance affecting quality of life with flomax. Of note, did have decrease in nightly urinations, but side effects outweighed the benefits. Stop flomax and referral to urology to discuss the other options.

## 2021-09-17 NOTE — ASSESSMENT & PLAN NOTE
With recent syncopal episode, BP in office, possible dehydration combined with hypotension. Will stop hydrochlorothiazide but continue lisinopril. Follow up in 2-4 weeks for annual physical and BP recheck.

## 2021-10-05 ASSESSMENT — ACTIVITIES OF DAILY LIVING (ADL): CURRENT_FUNCTION: NO ASSISTANCE NEEDED

## 2021-10-11 ENCOUNTER — OFFICE VISIT (OUTPATIENT)
Dept: FAMILY MEDICINE | Facility: CLINIC | Age: 81
End: 2021-10-11
Payer: MEDICARE

## 2021-10-11 ENCOUNTER — HEALTH MAINTENANCE LETTER (OUTPATIENT)
Age: 81
End: 2021-10-11

## 2021-10-11 VITALS
BODY MASS INDEX: 28.93 KG/M2 | HEIGHT: 66 IN | RESPIRATION RATE: 12 BRPM | SYSTOLIC BLOOD PRESSURE: 118 MMHG | DIASTOLIC BLOOD PRESSURE: 78 MMHG | HEART RATE: 60 BPM | TEMPERATURE: 97.8 F | WEIGHT: 180 LBS

## 2021-10-11 DIAGNOSIS — I10 ESSENTIAL HYPERTENSION: ICD-10-CM

## 2021-10-11 DIAGNOSIS — E55.9 HYPOVITAMINOSIS D: ICD-10-CM

## 2021-10-11 DIAGNOSIS — Z00.00 ENCOUNTER FOR MEDICARE ANNUAL WELLNESS EXAM: Primary | ICD-10-CM

## 2021-10-11 DIAGNOSIS — E78.5 HYPERLIPIDEMIA, UNSPECIFIED HYPERLIPIDEMIA TYPE: ICD-10-CM

## 2021-10-11 DIAGNOSIS — N52.9 ERECTILE DYSFUNCTION, UNSPECIFIED ERECTILE DYSFUNCTION TYPE: ICD-10-CM

## 2021-10-11 LAB
ALT SERPL W P-5'-P-CCNC: 21 U/L (ref 0–45)
ANION GAP SERPL CALCULATED.3IONS-SCNC: 10 MMOL/L (ref 5–18)
BUN SERPL-MCNC: 13 MG/DL (ref 8–28)
CALCIUM SERPL-MCNC: 9.2 MG/DL (ref 8.5–10.5)
CHLORIDE BLD-SCNC: 105 MMOL/L (ref 98–107)
CHOLEST SERPL-MCNC: 180 MG/DL
CO2 SERPL-SCNC: 23 MMOL/L (ref 22–31)
CREAT SERPL-MCNC: 0.84 MG/DL (ref 0.7–1.3)
FASTING STATUS PATIENT QL REPORTED: ABNORMAL
GFR SERPL CREATININE-BSD FRML MDRD: 83 ML/MIN/1.73M2
GLUCOSE BLD-MCNC: 98 MG/DL (ref 70–125)
HDLC SERPL-MCNC: 66 MG/DL
LDLC SERPL CALC-MCNC: 76 MG/DL
POTASSIUM BLD-SCNC: 4.5 MMOL/L (ref 3.5–5)
SODIUM SERPL-SCNC: 138 MMOL/L (ref 136–145)
TRIGL SERPL-MCNC: 190 MG/DL

## 2021-10-11 PROCEDURE — 84460 ALANINE AMINO (ALT) (SGPT): CPT | Performed by: FAMILY MEDICINE

## 2021-10-11 PROCEDURE — 80048 BASIC METABOLIC PNL TOTAL CA: CPT | Performed by: FAMILY MEDICINE

## 2021-10-11 PROCEDURE — G0439 PPPS, SUBSEQ VISIT: HCPCS | Performed by: FAMILY MEDICINE

## 2021-10-11 PROCEDURE — 80061 LIPID PANEL: CPT | Performed by: FAMILY MEDICINE

## 2021-10-11 PROCEDURE — 36415 COLL VENOUS BLD VENIPUNCTURE: CPT | Performed by: FAMILY MEDICINE

## 2021-10-11 ASSESSMENT — MIFFLIN-ST. JEOR: SCORE: 1469.22

## 2021-10-11 ASSESSMENT — ACTIVITIES OF DAILY LIVING (ADL): CURRENT_FUNCTION: NO ASSISTANCE NEEDED

## 2021-10-11 NOTE — ASSESSMENT & PLAN NOTE
Not a very good response with sildenafil.  Urology consult previously placed and encouraged to schedule the appointment.

## 2021-10-11 NOTE — ASSESSMENT & PLAN NOTE
Recheck levels and adjust supplementation as needed.  Currently on 1000 international units daily.

## 2021-10-11 NOTE — LETTER
October 11, 2021      Go Benton  [address] [facility] [facility] [address]        Dear ,    We are writing to inform you of your test results.    Your test results fall within the expected range(s) or remain unchanged from previous results.  Please continue with current treatment plan.    Resulted Orders   Basic metabolic panel   Result Value Ref Range    Sodium 138 136 - 145 mmol/L    Potassium 4.5 3.5 - 5.0 mmol/L    Chloride 105 98 - 107 mmol/L    Carbon Dioxide (CO2) 23 22 - 31 mmol/L    Anion Gap 10 5 - 18 mmol/L    Urea Nitrogen 13 8 - 28 mg/dL    Creatinine 0.84 0.70 - 1.30 mg/dL    Calcium 9.2 8.5 - 10.5 mg/dL    Glucose 98 70 - 125 mg/dL    GFR Estimate 83 >60 mL/min/1.73m2      Comment:      As of July 11, 2021, eGFR is calculated by the CKD-EPI creatinine equation, without race adjustment. eGFR can be influenced by muscle mass, exercise, and diet. The reported eGFR is an estimation only and is only applicable if the renal function is stable.   ALT   Result Value Ref Range    ALT 21 0 - 45 U/L   Lipid Profile   Result Value Ref Range    Cholesterol 180 <=199 mg/dL    Triglycerides 190 (H) <=149 mg/dL    Direct Measure HDL 66 >=40 mg/dL      Comment:      HDL Cholesterol Reference Range:     0-2 years:   No reference ranges established for patients under 2 years old  [facility] lipid analytes.    2-8 years:  Greater than 45 mg/dL     18 years and older:   Female: Greater than or equal to 50 mg/dL   Male:   Greater than or equal to 40 mg/dL    LDL Cholesterol Calculated 76 <=129 mg/dL    Patient Fasting > 8hrs? Unknown        If you have any questions or concerns, please call the clinic at the number listed above.       Sincerely,      Asim Howard, DO

## 2021-10-11 NOTE — ASSESSMENT & PLAN NOTE
Below goal of 130/80 on current regimen of lisinopril 10 mg.  Of note all the dizziness symptoms have cleared with removal of hydrochlorothiazide and blood pressure has stayed stable.

## 2021-10-11 NOTE — PROGRESS NOTES
"SUBJECTIVE:   Go Benton is a 81 year old male who presents for Preventive Visit.      Patient has been advised of split billing requirements and indicates understanding: Yes   Are you in the first 12 months of your Medicare coverage?  No    Denies any chest pain, shortness of breath, dyspnea exertion, palpitations, nausea or vomiting.  Denies any changes in vision or hearing, or urinary or bowel habits.      Healthy Habits:     In general, how would you rate your overall health?  Good    Frequency of exercise:  2-3 days/week    Duration of exercise:  30-45 minutes    Do you usually eat at least 4 servings of fruit and vegetables a day, include whole grains    & fiber and avoid regularly eating high fat or \"junk\" foods?  No    Taking medications regularly:  No    Barriers to taking medications:  None    Medication side effects:  None    Ability to successfully perform activities of daily living:  No assistance needed    Home Safety:  No safety concerns identified    Hearing Impairment:  No hearing concerns    In the past 6 months, have you been bothered by leaking of urine?  No    In general, how would you rate your overall mental or emotional health?  Excellent      PHQ-2 Total Score: 0    Additional concerns today:  No    Do you feel safe in your environment? Yes    Have you ever done Advance Care Planning? (For example, a Health Directive, POLST, or a discussion with a medical provider or your loved ones about your wishes): Yes, patient states has an Advance Care Planning document and will bring a copy to the clinic.       Fall risk  Fallen 2 or more times in the past year?: No  Any fall with injury in the past year?: No    Cognitive Screening   1) Repeat 3 items (Leader, Season, Table)    2) Clock draw: NORMAL  3) 3 item recall: Recalls 2 objects   Results: NORMAL clock, 1-2 items recalled: COGNITIVE IMPAIRMENT LESS LIKELY    Mini-CogTM Copyright [employer] [place]. Licensed by the author for use in [facility] " "[facility]; reprinted with permission (soob@[facility]). All rights reserved.        Reviewed and updated as needed this visit by clinical staff  Tobacco  Allergies  Meds  Problems  Med Hx  Surg Hx  Fam Hx  Soc Hx          Reviewed and updated as needed this visit by Provider  Tobacco  Allergies  Meds  Problems  Med Hx  Surg Hx  Fam Hx         Social History     Tobacco Use     Smoking status: Never Smoker     Smokeless tobacco: Never Used   Substance Use Topics     Alcohol use: Yes     Alcohol/week: 14.0 standard drinks     Types: 14 Glasses of wine per week       Alcohol Use 10/5/2021   Prescreen: >3 drinks/day or >7 drinks/week? No       Current providers sharing in care for this patient include:   Patient Care Team:  Asim Howard DO as PCP - General (Family Practice)  Asim Howard DO as Assigned PCP    The following health maintenance items are reviewed in Epic and correct as of today:  There are no preventive care reminders to display for this patient.  Lab work is in process    Review of Systems   All other systems reviewed and are negative.      OBJECTIVE:   /78 (BP Location: Left arm, Patient Position: Sitting, Cuff Size: Adult Large)   Pulse 60   Temp 97.8  F (36.6  C) (Oral)   Resp 12   Ht 1.676 m (5' 6\")   Wt 81.6 kg (180 lb)   BMI 29.05 kg/m   Estimated body mass index is 29.05 kg/m  as calculated from the following:    Height as of this encounter: 1.676 m (5' 6\").    Weight as of this encounter: 81.6 kg (180 lb).  Physical Exam  Vitals and nursing note reviewed.   Constitutional:       General: He is not in acute distress.     Appearance: Normal appearance.   HENT:      Head: Normocephalic and atraumatic.      Right Ear: Tympanic membrane, ear canal and external ear normal.      Left Ear: Tympanic membrane, ear canal and external ear normal.      Nose: Nose normal.      Mouth/Throat:      Mouth: Mucous membranes are moist.      Pharynx: Oropharynx is clear. " No oropharyngeal exudate.   Eyes:      General: No scleral icterus.     Extraocular Movements: Extraocular movements intact.      Conjunctiva/sclera: Conjunctivae normal.   Neck:      Vascular: No carotid bruit.   Cardiovascular:      Rate and Rhythm: Normal rate and regular rhythm.      Pulses: Normal pulses.      Heart sounds: Normal heart sounds. No murmur heard.   No friction rub. No gallop.    Pulmonary:      Effort: Pulmonary effort is normal.      Breath sounds: Normal breath sounds. No wheezing, rhonchi or rales.   Musculoskeletal:         General: No swelling. Normal range of motion.      Cervical back: Normal range of motion.      Right lower leg: No edema.      Left lower leg: No edema.   Skin:     General: Skin is warm and dry.      Capillary Refill: Capillary refill takes less than 2 seconds.      Coloration: Skin is not jaundiced.      Findings: No rash.   Neurological:      General: No focal deficit present.      Mental Status: He is alert and oriented to person, place, and time.      Cranial Nerves: No cranial nerve deficit.      Gait: Gait is intact. Gait normal.      Deep Tendon Reflexes:      Reflex Scores:       Bicep reflexes are 2+ on the right side and 2+ on the left side.       Patellar reflexes are 2+ on the right side and 2+ on the left side.  Psychiatric:         Mood and Affect: Mood normal.         Thought Content: Thought content normal.       ASSESSMENT / PLAN:     Problem List Items Addressed This Visit        Digestive    Hypovitaminosis D - Vitamin D [place]     Recheck levels and adjust supplementation as needed.  Currently on 1000 international units daily.            Endocrine    HLD (hyperlipidemia)     Given previous phimosis secondary prevention and he is tolerating well and thus will continue statin years.  Recheck levels as per guidance.         Relevant Orders    ALT    Lipid Profile       Circulatory    Essential hypertension  "    Below goal of 130/80 on current regimen of lisinopril 10 mg.  Of note all the dizziness symptoms have cleared with removal of hydrochlorothiazide and blood pressure has stayed stable.         Relevant Orders    Basic metabolic panel       Other    ED (erectile dysfunction)     Not a very good response with sildenafil.  Urology consult previously placed and encouraged to schedule the appointment.           Other Visit Diagnoses     Encounter for Medicare annual wellness exam    -  Primary          Patient has been advised of split billing requirements and indicates understanding: Yes  COUNSELING:  Reviewed preventive health counseling, as reflected in patient instructions       Regular exercise       Healthy diet/nutrition       Bladder control    Estimated body mass index is 29.05 kg/m  as calculated from the following:    Height as of this encounter: 1.676 m (5' 6\").    Weight as of this encounter: 81.6 kg (180 lb).    Weight management plan: Discussed healthy diet and exercise guidelines    He reports that he has never smoked. He has never used smokeless tobacco.      Appropriate preventive services were discussed with this patient, including applicable screening as appropriate for cardiovascular disease, diabetes, osteopenia/osteoporosis, and glaucoma.  As appropriate for age/gender, discussed screening for colorectal cancer, prostate cancer, breast cancer, and cervical cancer. Checklist reviewing preventive services available has been given to the patient.    Reviewed patients plan of care and provided an AVS. The Basic Care Plan (routine screening as documented in Health Maintenance) for Go meets the Care Plan requirement. This Care Plan has been established and reviewed with the Patient.    Counseling Resources:  ATP IV Guidelines  Pooled Cohorts Equation Calculator  Breast Cancer Risk Calculator  Breast Cancer: Medication to Reduce Risk  FRAX Risk Assessment  ICSI Preventive Guidelines  Dietary " Guidelines for Americans, 2010  USDA's MyPlate  ASA Prophylaxis  Lung CA Screening    Asim Howard DO  [facility]    Identified Health Risks:    The patient was counseled and encouraged to consider modifying their diet and eating habits. He was provided with information on recommended healthy diet options.

## 2021-10-11 NOTE — PATIENT INSTRUCTIONS
Patient Education   Personalized Prevention Plan  You are due for the preventive services outlined below.  Your care team is available to assist you in scheduling these services.  If you have already completed any of these items, please share that information with your care team to update in your medical record.  There are no preventive care reminders to display for this patient.    Understanding USDA MyPlate  The USDA has guidelines to help you make healthy food choices. These are called MyPlate. MyPlate shows the food groups that make up healthy meals using the image of a place setting. Before you eat, think about the healthiest choices for what to put on your plate or in your cup or bowl. To learn more about building a healthy plate, visit www.choosemyplate.gov.    The food groups    Fruits. Any fruit or 100% fruit juice counts as part of the Fruit Group. Fruits may be fresh, canned, frozen, or dried, and may be whole, cut-up, or pureed. Make 1/2 of your plate fruits and vegetables.    Vegetables. Any vegetable or 100% vegetable juice counts as a member of the Vegetable Group. Vegetables may be fresh, frozen, canned, or dried. They can be served raw or cooked and may be whole, cut-up, or mashed. Make 1/2 of your plate fruits and vegetables.    Grains. All foods made from grains are part of the Grains Group. These include wheat, rice, oats, cornmeal, and barley. Grains are often used to make foods such as bread, pasta, oatmeal, cereal, tortillas, and grits. Grains should be no more than 1/4 of your plate. At least half of your grains should be whole grains.    Protein. This group includes meat, poultry, seafood, beans and peas, eggs, processed soy products (such as tofu), nuts (including nut butters), and seeds. Make protein choices no more than 1/4 of your plate. Meat and poultry choices should be lean or low fat.    Dairy. The Dairy Group includes all fluid milk products and foods made from milk that contain  calcium, such as yogurt and cheese. (Foods that have little calcium, such as cream, butter, and cream cheese, are not part of this group.) Most dairy choices should be low-fat or fat-free.    Oils. Oils aren't a food group, but they do contain essential nutrients. However it's important to watch your intake of oils. These are fats that are liquid at room temperature. They include canola, corn, olive, soybean, vegetable, and sunflower oil. Foods that are mainly oil include mayonnaise, certain salad dressings, and soft margarines. You likely already get your daily oil allowance from the foods you eat.  Things to limit  Eating healthy also means limiting these things in your diet:       Salt (sodium). Many processed foods have a lot of sodium. To keep sodium intake down, eat fresh vegetables, meats, poultry, and seafood when possible. Purchase low-sodium, reduced-sodium, or no-salt-added food products at the store. And don't add salt to your meals at home. Instead, season them with herbs and spices such as dill, oregano, cumin, and paprika. Or try adding flavor with lemon or lime zest and juice.    Saturated fat. Saturated fats are most often found in animal products such as beef, pork, and chicken. They are often solid at room temperature, such as butter. To reduce your saturated fat intake, choose leaner cuts of meat and poultry. And try healthier cooking methods such as grilling, broiling, roasting, or baking. For a simple lower-fat swap, use plain nonfat yogurt instead of mayonnaise when making potato salad or macaroni salad.    Added sugars. These are sugars added to foods. They are in foods such as ice cream, candy, soda, fruit drinks, sports drinks, energy drinks, cookies, pastries, jams, and syrups. Cut down on added sugars by sharing sweet treats with a family member or friend. You can also choose fruit for dessert, and drink water or other unsweetened beverages.     [employer] last reviewed this educational  content on 6/1/2020 2000-2021 [employer]. All rights reserved. This information is not intended as a substitute for professional medical care. Always follow your healthcare professional's instructions.

## 2021-10-11 NOTE — ASSESSMENT & PLAN NOTE
Given previous phimosis secondary prevention and he is tolerating well and thus will continue statin years.  Recheck levels as per guidance.

## 2021-10-21 DIAGNOSIS — N52.9 ERECTILE DYSFUNCTION, UNSPECIFIED ERECTILE DYSFUNCTION TYPE: Primary | ICD-10-CM

## 2021-10-21 RX ORDER — SILDENAFIL CITRATE 20 MG/1
20 TABLET ORAL 3 TIMES DAILY
Qty: 50 TABLET | Refills: 1 | Status: SHIPPED | OUTPATIENT
Start: 2021-10-21 | End: 2022-10-05

## 2021-10-21 NOTE — TELEPHONE ENCOUNTER
Pt calling for refill of sildenafil 20mg  States he takes 5 per day prn.  rx prepped below per instruction on last pill bottle from previous dr.  Please advise if you will fill  Pt aware dr out of office until tomorrow

## 2021-12-06 ENCOUNTER — DOCUMENTATION ONLY (OUTPATIENT)
Dept: OTHER | Facility: CLINIC | Age: 81
End: 2021-12-06
Payer: MEDICARE

## 2022-08-20 DIAGNOSIS — E78.00 HYPERCHOLESTEREMIA: ICD-10-CM

## 2022-08-20 RX ORDER — ATORVASTATIN CALCIUM 20 MG/1
TABLET, FILM COATED ORAL
Qty: 90 TABLET | Refills: 0 | Status: SHIPPED | OUTPATIENT
Start: 2022-08-20 | End: 2022-09-12

## 2022-08-24 ENCOUNTER — TRANSFERRED RECORDS (OUTPATIENT)
Dept: HEALTH INFORMATION MANAGEMENT | Facility: CLINIC | Age: 82
End: 2022-08-24

## 2022-09-10 DIAGNOSIS — I10 ESSENTIAL HYPERTENSION: ICD-10-CM

## 2022-09-10 RX ORDER — LISINOPRIL 10 MG/1
TABLET ORAL
Qty: 30 TABLET | Refills: 0 | Status: SHIPPED | OUTPATIENT
Start: 2022-09-10 | End: 2022-10-05

## 2022-09-11 NOTE — TELEPHONE ENCOUNTER
"Last Written Prescription Date:  9/17/21  Last Fill Quantity: 90,  # refills: 3   Last office visit provider:  10/11/21     Requested Prescriptions   Pending Prescriptions Disp Refills     lisinopril (ZESTRIL) 10 MG tablet [Pharmacy Med Name: LISINOPRIL 10 MG TABLET] 90 tablet 3     Sig: TAKE 1 TABLET BY MOUTH EVERY DAY       ACE Inhibitors (Including Combos) Protocol Passed - 9/10/2022  8:20 AM        Passed - Blood pressure under 140/90 in past 12 months     BP Readings from Last 3 Encounters:   10/11/21 118/78   09/17/21 112/72   08/23/21 137/67                 Passed - Recent (12 mo) or future (30 days) visit within the authorizing provider's specialty     Patient has had an office visit with the authorizing provider or a provider within the authorizing providers department within the previous 12 mos or has a future within next 30 days. See \"Patient Info\" tab in inbasket, or \"Choose Columns\" in Meds & Orders section of the refill encounter.              Passed - Medication is active on med list        Passed - Patient is age 18 or older        Passed - Normal serum creatinine on file in past 12 months     Recent Labs   Lab Test 10/11/21  0857   CR 0.84       Ok to refill medication if creatinine is low          Passed - Normal serum potassium on file in past 12 months     Recent Labs   Lab Test 10/11/21  0857   POTASSIUM 4.5                  Salina Collins, RN 09/10/22 7:15 PM  "

## 2022-09-12 DIAGNOSIS — E78.00 HYPERCHOLESTEREMIA: ICD-10-CM

## 2022-09-12 RX ORDER — ATORVASTATIN CALCIUM 20 MG/1
TABLET, FILM COATED ORAL
Qty: 90 TABLET | Refills: 0 | Status: SHIPPED | OUTPATIENT
Start: 2022-09-12 | End: 2022-10-05

## 2022-09-12 NOTE — TELEPHONE ENCOUNTER
"Routing refill request to provider for review/approval because:  Early refill requested.    Last Written Prescription Date:  8/20/22  Last Fill Quantity: 90,  # refills: 0   Last office visit provider:  10/11/21     Requested Prescriptions   Pending Prescriptions Disp Refills     atorvastatin (LIPITOR) 20 MG tablet [Pharmacy Med Name: ATORVASTATIN 20 MG TABLET] 90 tablet 0     Sig: TAKE 1 TABLET BY MOUTH EVERYDAY AT BEDTIME       Statins Protocol Passed - 9/12/2022  3:25 PM        Passed - LDL on file in past 12 months     Recent Labs   Lab Test 10/11/21  0857   LDL 76             Passed - No abnormal creatine kinase in past 12 months     No lab results found.             Passed - Recent (12 mo) or future (30 days) visit within the authorizing provider's specialty     Patient has had an office visit with the authorizing provider or a provider within the authorizing providers department within the previous 12 mos or has a future within next 30 days. See \"Patient Info\" tab in inbasket, or \"Choose Columns\" in Meds & Orders section of the refill encounter.              Passed - Medication is active on med list        Passed - Patient is age 18 or older             Sigifredo Robles RN 09/12/22 3:25 PM  "

## 2022-09-25 ENCOUNTER — HEALTH MAINTENANCE LETTER (OUTPATIENT)
Age: 82
End: 2022-09-25

## 2022-10-05 ENCOUNTER — OFFICE VISIT (OUTPATIENT)
Dept: FAMILY MEDICINE | Facility: CLINIC | Age: 82
End: 2022-10-05
Payer: MEDICARE

## 2022-10-05 VITALS
BODY MASS INDEX: 28.93 KG/M2 | HEART RATE: 60 BPM | DIASTOLIC BLOOD PRESSURE: 78 MMHG | TEMPERATURE: 98.2 F | HEIGHT: 66 IN | RESPIRATION RATE: 12 BRPM | WEIGHT: 180 LBS | SYSTOLIC BLOOD PRESSURE: 128 MMHG

## 2022-10-05 DIAGNOSIS — E78.5 HYPERLIPIDEMIA, UNSPECIFIED HYPERLIPIDEMIA TYPE: ICD-10-CM

## 2022-10-05 DIAGNOSIS — I10 ESSENTIAL HYPERTENSION: ICD-10-CM

## 2022-10-05 DIAGNOSIS — N52.9 ERECTILE DYSFUNCTION, UNSPECIFIED ERECTILE DYSFUNCTION TYPE: ICD-10-CM

## 2022-10-05 DIAGNOSIS — Z13.1 DIABETES MELLITUS SCREENING: ICD-10-CM

## 2022-10-05 DIAGNOSIS — Z00.00 ENCOUNTER FOR MEDICARE ANNUAL WELLNESS EXAM: Primary | ICD-10-CM

## 2022-10-05 PROBLEM — Z96.1 PSEUDOPHAKIA OF RIGHT EYE: Status: ACTIVE | Noted: 2018-07-03

## 2022-10-05 PROBLEM — H43.819 POSTERIOR VITREOUS DETACHMENT: Status: ACTIVE | Noted: 2018-05-02

## 2022-10-05 LAB
ALT SERPL W P-5'-P-CCNC: 32 U/L (ref 10–50)
ANION GAP SERPL CALCULATED.3IONS-SCNC: 10 MMOL/L (ref 7–15)
BUN SERPL-MCNC: 14.3 MG/DL (ref 8–23)
CALCIUM SERPL-MCNC: 8.9 MG/DL (ref 8.8–10.2)
CHLORIDE SERPL-SCNC: 101 MMOL/L (ref 98–107)
CHOLEST SERPL-MCNC: 187 MG/DL
CREAT SERPL-MCNC: 0.89 MG/DL (ref 0.67–1.17)
DEPRECATED HCO3 PLAS-SCNC: 25 MMOL/L (ref 22–29)
GFR SERPL CREATININE-BSD FRML MDRD: 86 ML/MIN/1.73M2
GLUCOSE SERPL-MCNC: 95 MG/DL (ref 70–99)
HDLC SERPL-MCNC: 66 MG/DL
LDLC SERPL CALC-MCNC: 93 MG/DL
NONHDLC SERPL-MCNC: 121 MG/DL
POTASSIUM SERPL-SCNC: 4.3 MMOL/L (ref 3.4–5.3)
SODIUM SERPL-SCNC: 136 MMOL/L (ref 136–145)
TRIGL SERPL-MCNC: 142 MG/DL

## 2022-10-05 PROCEDURE — 80061 LIPID PANEL: CPT | Performed by: FAMILY MEDICINE

## 2022-10-05 PROCEDURE — 36415 COLL VENOUS BLD VENIPUNCTURE: CPT | Performed by: FAMILY MEDICINE

## 2022-10-05 PROCEDURE — G0439 PPPS, SUBSEQ VISIT: HCPCS | Performed by: FAMILY MEDICINE

## 2022-10-05 PROCEDURE — 80048 BASIC METABOLIC PNL TOTAL CA: CPT | Performed by: FAMILY MEDICINE

## 2022-10-05 PROCEDURE — 84460 ALANINE AMINO (ALT) (SGPT): CPT | Performed by: FAMILY MEDICINE

## 2022-10-05 PROCEDURE — 99213 OFFICE O/P EST LOW 20 MIN: CPT | Mod: 25 | Performed by: FAMILY MEDICINE

## 2022-10-05 RX ORDER — SILDENAFIL 100 MG/1
100 TABLET, FILM COATED ORAL DAILY PRN
Qty: 9 TABLET | Refills: 11 | Status: SHIPPED | OUTPATIENT
Start: 2022-10-05 | End: 2024-01-26

## 2022-10-05 RX ORDER — ATORVASTATIN CALCIUM 20 MG/1
20 TABLET, FILM COATED ORAL AT BEDTIME
Qty: 90 TABLET | Refills: 0 | Status: SHIPPED | OUTPATIENT
Start: 2022-10-05 | End: 2022-10-11

## 2022-10-05 RX ORDER — SILDENAFIL CITRATE 20 MG/1
20 TABLET ORAL 3 TIMES DAILY
Qty: 50 TABLET | Refills: 1 | Status: CANCELLED | OUTPATIENT
Start: 2022-10-05

## 2022-10-05 RX ORDER — LISINOPRIL 10 MG/1
10 TABLET ORAL DAILY
Qty: 30 TABLET | Refills: 0 | Status: SHIPPED | OUTPATIENT
Start: 2022-10-05 | End: 2022-10-11

## 2022-10-05 ASSESSMENT — ENCOUNTER SYMPTOMS
HEMATOCHEZIA: 0
PALPITATIONS: 0
EYE PAIN: 0
HEMATURIA: 0
CONSTIPATION: 0
DIARRHEA: 0
SORE THROAT: 0
DYSURIA: 0
COUGH: 0
PARESTHESIAS: 0
ABDOMINAL PAIN: 0
FEVER: 0
MYALGIAS: 1
HEADACHES: 0
HEARTBURN: 0
NAUSEA: 0
NERVOUS/ANXIOUS: 0
SHORTNESS OF BREATH: 0
DIZZINESS: 0
ARTHRALGIAS: 1
CHILLS: 0
FREQUENCY: 1
JOINT SWELLING: 0
WEAKNESS: 0

## 2022-10-05 ASSESSMENT — ACTIVITIES OF DAILY LIVING (ADL): CURRENT_FUNCTION: NO ASSISTANCE NEEDED

## 2022-10-05 NOTE — PATIENT INSTRUCTIONS
Patient Education   Personalized Prevention Plan  You are due for the preventive services outlined below.  Your care team is available to assist you in scheduling these services.  If you have already completed any of these items, please share that information with your care team to update in your medical record.  Health Maintenance Due   Topic Date Due     ANNUAL REVIEW OF HM ORDERS  09/17/2022

## 2022-10-05 NOTE — ASSESSMENT & PLAN NOTE
Below goal of 130/80 on current regimen which does include lisinopril 10 mg daily.  We will check BMP as per guidance.  Continue diet and exercise as well as current medication.

## 2022-10-05 NOTE — PROGRESS NOTES
"SUBJECTIVE:   Michael is a 82 year old who presents for Preventive Visit.      Patient has been advised of split billing requirements and indicates understanding: Yes  Are you in the first 12 months of your Medicare coverage?  No    Denies any chest pain, shortness of breath, dyspnea exertion, palpitations, nausea or vomiting.  Denies any changes in vision or hearing, or urinary or bowel habits.      Healthy Habits:     In general, how would you rate your overall health?  Good    Frequency of exercise:  2-3 days/week    Duration of exercise:  30-45 minutes    Do you usually eat at least 4 servings of fruit and vegetables a day, include whole grains    & fiber and avoid regularly eating high fat or \"junk\" foods?  Yes    Taking medications regularly:  Yes    Barriers to taking medications:  None    Medication side effects:  None    Ability to successfully perform activities of daily living:  No assistance needed    Home Safety:  No safety concerns identified    Hearing Impairment:  No hearing concerns    In the past 6 months, have you been bothered by leaking of urine?  No    In general, how would you rate your overall mental or emotional health?  Excellent      PHQ-2 Total Score: 0    Additional concerns today:  No       Do you feel safe in your environment? Yes    Have you ever done Advance Care Planning? (For example, a Health Directive, POLST, or a discussion with a medical provider or your loved ones about your wishes): Yes, advance care planning is on file.      Fall risk  Fallen 2 or more times in the past year?: No  Any fall with injury in the past year?: No    Cognitive Screening   1) Repeat 3 items (Leader, Season, Table)    2) Clock draw: NORMAL  3) 3 item recall: Recalls 1 object   Results: ABNORMAL clock, 1-2 items recalled: PROBABLE COGNITIVE IMPAIRMENT, **INFORM PROVIDER**    Mini-CogTM Copyright KATRINA Porter. Licensed by the author for use in [facility]; reprinted with permission (demetra@.[facility]). " All rights reserved.      Reviewed and updated as needed this visit by clinical staff   Tobacco  Allergies  Meds  Problems  Med Hx  Surg Hx  Fam Hx  Soc   Hx          Reviewed and updated as needed this visit by Provider   Tobacco  Allergies  Meds  Problems  Med Hx  Surg Hx  Fam Hx           Social History     Tobacco Use     Smoking status: Never Smoker     Smokeless tobacco: Never Used   Substance Use Topics     Alcohol use: Yes     Alcohol/week: 14.0 standard drinks     Types: 14 Glasses of wine per week       Alcohol Use 10/5/2022   Prescreen: >3 drinks/day or >7 drinks/week? No     Current providers sharing in care for this patient include:   Patient Care Team:  Asim Howard DO as PCP - General (Family Practice)  Asim Howard DO as Assigned PCP    The following health maintenance items are reviewed in Epic and correct as of today:  Health Maintenance   Topic Date Due     ANNUAL REVIEW OF HM ORDERS  09/17/2022     INFLUENZA VACCINE (1) 11/05/2022 (Originally 9/1/2022)     COVID-19 Vaccine (4 - Booster for Moderna series) 11/05/2022 (Originally 1/10/2022)     MEDICARE ANNUAL WELLNESS VISIT  10/05/2023     FALL RISK ASSESSMENT  10/05/2023     ADVANCE CARE PLANNING  10/05/2027     DTAP/TDAP/TD IMMUNIZATION (2 - Td or Tdap) 07/01/2029     PHQ-2 (once per calendar year)  Completed     Pneumococcal Vaccine: 65+ Years  Completed     ZOSTER IMMUNIZATION  Completed     IPV IMMUNIZATION  Aged Out     MENINGITIS IMMUNIZATION  Aged Out     HEPATITIS B IMMUNIZATION  Aged Out     Lab work is in process    Review of Systems   Constitutional: Negative for chills and fever.   HENT: Negative for congestion, ear pain, hearing loss and sore throat.    Eyes: Negative for pain and visual disturbance.   Respiratory: Negative for cough and shortness of breath.    Cardiovascular: Negative for chest pain, palpitations and peripheral edema.   Gastrointestinal: Negative for abdominal pain, constipation,  "diarrhea, heartburn, hematochezia and nausea.   Genitourinary: Positive for frequency and impotence. Negative for dysuria, genital sores, hematuria, penile discharge and urgency.   Musculoskeletal: Positive for arthralgias and myalgias. Negative for joint swelling.   Skin: Negative for rash.   Neurological: Negative for dizziness, weakness, headaches and paresthesias.   Psychiatric/Behavioral: Negative for mood changes. The patient is not nervous/anxious.        OBJECTIVE:   /78 (BP Location: Left arm, Patient Position: Sitting, Cuff Size: Adult Large)   Pulse 60   Temp 98.2  F (36.8  C) (Oral)   Resp 12   Ht 1.676 m (5' 6\")   Wt 81.6 kg (180 lb)   BMI 29.05 kg/m   Estimated body mass index is 29.05 kg/m  as calculated from the following:    Height as of this encounter: 1.676 m (5' 6\").    Weight as of this encounter: 81.6 kg (180 lb).  Physical Exam  Vitals and nursing note reviewed.   Constitutional:       General: He is not in acute distress.     Appearance: Normal appearance.   HENT:      Head: Normocephalic and atraumatic.      Right Ear: Tympanic membrane, ear canal and external ear normal.      Left Ear: Tympanic membrane, ear canal and external ear normal.      Nose: Nose normal.      Mouth/Throat:      Mouth: Mucous membranes are moist.      Pharynx: Oropharynx is clear. No oropharyngeal exudate.   Eyes:      General: No scleral icterus.     Extraocular Movements: Extraocular movements intact.      Conjunctiva/sclera: Conjunctivae normal.   Neck:      Vascular: No carotid bruit.   Cardiovascular:      Rate and Rhythm: Normal rate and regular rhythm.      Pulses: Normal pulses.      Heart sounds: Normal heart sounds. No murmur heard.    No friction rub. No gallop.   Pulmonary:      Effort: Pulmonary effort is normal.      Breath sounds: Normal breath sounds. No wheezing, rhonchi or rales.   Musculoskeletal:         General: No swelling. Normal range of motion.      Cervical back: Normal range of " "motion.      Right lower leg: No edema.      Left lower leg: No edema.   Skin:     General: Skin is warm and dry.      Capillary Refill: Capillary refill takes less than 2 seconds.      Coloration: Skin is not jaundiced.      Findings: No rash.   Neurological:      General: No focal deficit present.      Mental Status: He is alert and oriented to person, place, and time.      Cranial Nerves: No cranial nerve deficit.      Gait: Gait is intact. Gait normal.      Deep Tendon Reflexes:      Reflex Scores:       Bicep reflexes are 2+ on the right side and 2+ on the left side.       Patellar reflexes are 2+ on the right side and 2+ on the left side.  Psychiatric:         Mood and Affect: Mood normal.         Thought Content: Thought content normal.       ASSESSMENT / PLAN:     Problem List Items Addressed This Visit        Medium    HLD (hyperlipidemia)     Tolerating moderate dose statin very well.  Will continue.  We will check lipid profile and ALT per guidance.           Relevant Medications    atorvastatin (LIPITOR) 20 MG tablet    Other Relevant Orders    Lipid panel reflex to direct LDL Fasting    ALT    Essential hypertension     Below goal of 130/80 on current regimen which does include lisinopril 10 mg daily.  We will check BMP as per guidance.  Continue diet and exercise as well as current medication.           Relevant Medications    lisinopril (ZESTRIL) 10 MG tablet    Other Relevant Orders    Basic metabolic panel    ED (erectile dysfunction)     Currently using 100 mg as needed about 2 or 3 times a month.  Does not get a full response but gets a \"good enough\" response.           Relevant Medications    sildenafil (VIAGRA) 100 MG tablet      Other Visit Diagnoses     Encounter for Medicare annual wellness exam    -  Primary    Diabetes mellitus screening        Relevant Orders    Basic metabolic panel          Patient has been advised of split billing requirements and indicates understanding: " "Yes    COUNSELING:  Reviewed preventive health counseling, as reflected in patient instructions       Regular exercise       Healthy diet/nutrition       Hearing screening       Dental care    Estimated body mass index is 29.05 kg/m  as calculated from the following:    Height as of this encounter: 1.676 m (5' 6\").    Weight as of this encounter: 81.6 kg (180 lb).        He reports that he has never smoked. He has never used smokeless tobacco.      Appropriate preventive services were discussed with this patient, including applicable screening as appropriate for cardiovascular disease, diabetes, osteopenia/osteoporosis, and glaucoma.  As appropriate for age/gender, discussed screening for colorectal cancer, prostate cancer, breast cancer, and cervical cancer. Checklist reviewing preventive services available has been given to the patient.    Reviewed patients plan of care and provided an AVS. The Basic Care Plan (routine screening as documented in Health Maintenance) for Go meets the Care Plan requirement. This Care Plan has been established and reviewed with the Patient.    Counseling Resources:  ATP IV Guidelines  Pooled Cohorts Equation Calculator  Breast Cancer Risk Calculator  Breast Cancer: Medication to Reduce Risk  FRAX Risk Assessment  ICSI Preventive Guidelines  Dietary Guidelines for Americans, 2010  USDA's MyPlate  ASA Prophylaxis  Lung CA Screening    DO MICKEY Lawler [facility]    Identified Health Risks:  "

## 2022-10-05 NOTE — ASSESSMENT & PLAN NOTE
Tolerating moderate dose statin very well.  Will continue.  We will check lipid profile and ALT per guidance.

## 2022-10-05 NOTE — ASSESSMENT & PLAN NOTE
"Currently using 100 mg as needed about 2 or 3 times a month.  Does not get a full response but gets a \"good enough\" response.  "

## 2022-10-06 DIAGNOSIS — I10 ESSENTIAL HYPERTENSION: ICD-10-CM

## 2022-10-06 RX ORDER — LISINOPRIL 10 MG/1
TABLET ORAL
Qty: 30 TABLET | Refills: 0 | OUTPATIENT
Start: 2022-10-06

## 2022-10-10 ENCOUNTER — TELEPHONE (OUTPATIENT)
Dept: FAMILY MEDICINE | Facility: CLINIC | Age: 82
End: 2022-10-10

## 2022-10-10 DIAGNOSIS — E78.5 HYPERLIPIDEMIA, UNSPECIFIED HYPERLIPIDEMIA TYPE: ICD-10-CM

## 2022-10-10 DIAGNOSIS — I10 ESSENTIAL HYPERTENSION: ICD-10-CM

## 2022-10-10 NOTE — TELEPHONE ENCOUNTER
Medication Question or Refill    What medication are you calling about (include dose and sig)?:   Atorvastatin Calcium 20 MG Oral Tablet (LIPITOR)- Route: Take 1 tablet (20 mg) by mouth At Bedtime    Lisinopril 10 MG Oral Tablet (ZESTRIL)  Route: Take 1 tablet (10 mg) by mouth daily     Who prescribed the medication?: Dr. Howard    Do you need a refill? No    When did you use the medication last? Today    Patient offered an appointment? No last appointment 10/05/22    Do you have any questions or concerns? Yes  Patient said that Dr. Howard resent over all of his prescriptions during his last visit 10/05/22 and he didn't receive any refills.  He takes these medications everyday and usually gets 90 day fills with refills.  Patient is requesting that those scripts be updated to 90-days and get some refills added as well.  Please advise on prescription change.    Preferred Pharmacy:   [facility]/pharmacy #[id number] [address] [address]  Phone: [phone] Fax: [phone]      Could we send this information to you in [email or web address] or would you prefer to receive a phone call?:   Patient would prefer a phone call   Okay to leave a detailed message?: Yes at Cell number on file:    Telephone Information:   Mobile [phone]   Or wifes phone.

## 2022-10-11 RX ORDER — LISINOPRIL 10 MG/1
10 TABLET ORAL DAILY
Qty: 90 TABLET | Refills: 3 | Status: SHIPPED | OUTPATIENT
Start: 2022-10-11 | End: 2023-10-27

## 2022-10-11 RX ORDER — ATORVASTATIN CALCIUM 20 MG/1
20 TABLET, FILM COATED ORAL AT BEDTIME
Qty: 90 TABLET | Refills: 3 | Status: SHIPPED | OUTPATIENT
Start: 2022-10-11 | End: 2023-10-30

## 2023-09-05 ENCOUNTER — PATIENT OUTREACH (OUTPATIENT)
Dept: CARE COORDINATION | Facility: CLINIC | Age: 83
End: 2023-09-05
Payer: MEDICARE

## 2023-09-19 ENCOUNTER — PATIENT OUTREACH (OUTPATIENT)
Dept: CARE COORDINATION | Facility: CLINIC | Age: 83
End: 2023-09-19
Payer: MEDICARE

## 2023-09-28 ENCOUNTER — TRANSFERRED RECORDS (OUTPATIENT)
Dept: HEALTH INFORMATION MANAGEMENT | Facility: CLINIC | Age: 83
End: 2023-09-28
Payer: MEDICARE

## 2023-10-27 DIAGNOSIS — I10 ESSENTIAL HYPERTENSION: ICD-10-CM

## 2023-10-27 RX ORDER — LISINOPRIL 10 MG/1
10 TABLET ORAL DAILY
Qty: 90 TABLET | Refills: 0 | Status: SHIPPED | OUTPATIENT
Start: 2023-10-27 | End: 2024-01-24

## 2023-10-28 DIAGNOSIS — E78.5 HYPERLIPIDEMIA, UNSPECIFIED HYPERLIPIDEMIA TYPE: ICD-10-CM

## 2023-10-30 RX ORDER — ATORVASTATIN CALCIUM 20 MG/1
20 TABLET, FILM COATED ORAL AT BEDTIME
Qty: 90 TABLET | Refills: 0 | Status: SHIPPED | OUTPATIENT
Start: 2023-10-30 | End: 2024-01-29

## 2023-11-20 ENCOUNTER — TELEPHONE (OUTPATIENT)
Dept: FAMILY MEDICINE | Facility: CLINIC | Age: 83
End: 2023-11-20
Payer: MEDICARE

## 2023-11-20 NOTE — TELEPHONE ENCOUNTER
Patient Quality Outreach    Patient is due for the following:   Physical Annual Wellness Visit    Next Steps:   Patient has upcoming appointment, these items will be addressed at that time.    Type of outreach:    Chart review performed, no outreach needed.  Pt has AWV scheduled for 12/4/23 w/PCP.      Questions for provider review:    None           Manpreet Love Jr., MA

## 2023-11-30 ASSESSMENT — ENCOUNTER SYMPTOMS
HEARTBURN: 0
FREQUENCY: 1
SHORTNESS OF BREATH: 0
HEADACHES: 0
WEAKNESS: 0
FEVER: 0
CONSTIPATION: 0
HEMATURIA: 0
ARTHRALGIAS: 0
DIZZINESS: 0
NAUSEA: 0
PALPITATIONS: 0
DIARRHEA: 0
COUGH: 0
JOINT SWELLING: 0
NERVOUS/ANXIOUS: 0
PARESTHESIAS: 0
MYALGIAS: 0
HEMATOCHEZIA: 0
ABDOMINAL PAIN: 0
DYSURIA: 0
SORE THROAT: 0
EYE PAIN: 0
CHILLS: 0

## 2023-11-30 ASSESSMENT — ACTIVITIES OF DAILY LIVING (ADL): CURRENT_FUNCTION: NO ASSISTANCE NEEDED

## 2023-12-04 ENCOUNTER — OFFICE VISIT (OUTPATIENT)
Dept: FAMILY MEDICINE | Facility: CLINIC | Age: 83
End: 2023-12-04
Attending: FAMILY MEDICINE
Payer: MEDICARE

## 2023-12-04 VITALS
HEIGHT: 66 IN | TEMPERATURE: 97.8 F | HEART RATE: 53 BPM | SYSTOLIC BLOOD PRESSURE: 138 MMHG | DIASTOLIC BLOOD PRESSURE: 76 MMHG | WEIGHT: 177.6 LBS | BODY MASS INDEX: 28.54 KG/M2 | RESPIRATION RATE: 12 BRPM | OXYGEN SATURATION: 97 %

## 2023-12-04 DIAGNOSIS — E78.5 HYPERLIPIDEMIA, UNSPECIFIED HYPERLIPIDEMIA TYPE: ICD-10-CM

## 2023-12-04 DIAGNOSIS — R39.14 BENIGN PROSTATIC HYPERPLASIA WITH INCOMPLETE BLADDER EMPTYING: ICD-10-CM

## 2023-12-04 DIAGNOSIS — Z13.1 DIABETES MELLITUS SCREENING: ICD-10-CM

## 2023-12-04 DIAGNOSIS — D72.829 LEUKOCYTOSIS, UNSPECIFIED TYPE: ICD-10-CM

## 2023-12-04 DIAGNOSIS — N52.9 ERECTILE DYSFUNCTION, UNSPECIFIED ERECTILE DYSFUNCTION TYPE: ICD-10-CM

## 2023-12-04 DIAGNOSIS — Z00.00 ENCOUNTER FOR MEDICARE ANNUAL WELLNESS EXAM: Primary | ICD-10-CM

## 2023-12-04 DIAGNOSIS — N40.1 BENIGN PROSTATIC HYPERPLASIA WITH INCOMPLETE BLADDER EMPTYING: ICD-10-CM

## 2023-12-04 DIAGNOSIS — I10 ESSENTIAL HYPERTENSION: ICD-10-CM

## 2023-12-04 LAB
ERYTHROCYTE [DISTWIDTH] IN BLOOD BY AUTOMATED COUNT: 11.7 % (ref 10–15)
HCT VFR BLD AUTO: 48.7 % (ref 40–53)
HGB BLD-MCNC: 15.8 G/DL (ref 13.3–17.7)
MCH RBC QN AUTO: 29.6 PG (ref 26.5–33)
MCHC RBC AUTO-ENTMCNC: 32.4 G/DL (ref 31.5–36.5)
MCV RBC AUTO: 91 FL (ref 78–100)
PLATELET # BLD AUTO: 183 10E3/UL (ref 150–450)
RBC # BLD AUTO: 5.34 10E6/UL (ref 4.4–5.9)
WBC # BLD AUTO: 6.6 10E3/UL (ref 4–11)

## 2023-12-04 PROCEDURE — 85027 COMPLETE CBC AUTOMATED: CPT | Performed by: FAMILY MEDICINE

## 2023-12-04 PROCEDURE — G0439 PPPS, SUBSEQ VISIT: HCPCS | Performed by: FAMILY MEDICINE

## 2023-12-04 PROCEDURE — 99214 OFFICE O/P EST MOD 30 MIN: CPT | Mod: 25 | Performed by: FAMILY MEDICINE

## 2023-12-04 PROCEDURE — 80053 COMPREHEN METABOLIC PANEL: CPT | Performed by: FAMILY MEDICINE

## 2023-12-04 PROCEDURE — 36415 COLL VENOUS BLD VENIPUNCTURE: CPT | Performed by: FAMILY MEDICINE

## 2023-12-04 PROCEDURE — 80061 LIPID PANEL: CPT | Performed by: FAMILY MEDICINE

## 2023-12-04 ASSESSMENT — ENCOUNTER SYMPTOMS
FREQUENCY: 1
PARESTHESIAS: 0
ARTHRALGIAS: 0
SORE THROAT: 0
PALPITATIONS: 0
MYALGIAS: 0
HEMATOCHEZIA: 0
EYE PAIN: 0
HEMATURIA: 0
HEADACHES: 0
NERVOUS/ANXIOUS: 0
COUGH: 0
JOINT SWELLING: 0
DIZZINESS: 0
DYSURIA: 0
HEARTBURN: 0
DIARRHEA: 0
CONSTIPATION: 0
CHILLS: 0
FEVER: 0
SHORTNESS OF BREATH: 0
NAUSEA: 0
ABDOMINAL PAIN: 0
WEAKNESS: 0

## 2023-12-04 ASSESSMENT — ACTIVITIES OF DAILY LIVING (ADL): CURRENT_FUNCTION: NO ASSISTANCE NEEDED

## 2023-12-04 NOTE — ASSESSMENT & PLAN NOTE
Below goal of 140/90 on current regimen which does include lisinopril 10 mg daily.  Will continue at current dosing.  Continue diet and exercise.  Discussed optimizing exercise by getting at least 150 minutes of exercise per week which can be done through walking.

## 2023-12-04 NOTE — PATIENT INSTRUCTIONS
"Patient Education   Personalized Prevention Plan  You are due for the preventive services outlined below.  Your care team is available to assist you in scheduling these services.  If you have already completed any of these items, please share that information with your care team to update in your medical record.  Health Maintenance Due   Topic Date Due     ANNUAL REVIEW OF  ORDERS  10/05/2023     Learning About Being Physically Active  What is physical activity?     Being physically active means doing any kind of activity that gets your body moving.  The types of physical activity that can help you get fit and stay healthy include:  Aerobic or \"cardio\" activities. These make your heart beat faster and make you breathe harder, such as brisk walking, riding a bike, or running. They strengthen your heart and lungs and build up your endurance.  Strength training activities. These make your muscles work against, or \"resist,\" something. Examples include lifting weights or doing push-ups. These activities help tone and strengthen your muscles and bones.  Stretches. These let you move your joints and muscles through their full range of motion. Stretching helps you be more flexible.  Reaching a balance between these three types of physical activity is important because each one contributes to your overall fitness.  What are the benefits of being active?  Being active is one of the best things you can do for your health. It helps you to:  Feel stronger and have more energy to do all the things you like to do.  Focus better at school or work.  Feel, think, and sleep better.  Reach and stay at a healthy weight.  Lose fat and build lean muscle.  Lower your risk for serious health problems, including diabetes, heart attack, high blood pressure, and some cancers.  Keep your heart, lungs, bones, muscles, and joints strong and healthy.  How can you make being active part of your life?  Start slowly. Make it your long-term goal to " "get at least 30 minutes of exercise on most days of the week. Walking is a good choice. You also may want to do other activities, such as running, swimming, cycling, or playing tennis or team sports.  Pick activities that you like--ones that make your heart beat faster, your muscles stronger, and your muscles and joints more flexible. If you find more than one thing you like doing, do them all. You don't have to do the same thing every day.  Get your heart pumping every day. Any activity that makes your heart beat faster and keeps it at that rate for a while counts.  Here are some great ways to get your heart beating faster:  Go for a brisk walk, run, or hike.  Go for a swim or bike ride.  Take an online exercise class or dance.  Play a game of touch football, basketball, or soccer.  Play tennis, pickleball, or racquetball.  Climb stairs.  Even some household chores can be aerobic. Just do them at a faster pace. Raking or mowing the lawn, sweeping the garage, and vacuuming and cleaning your home all can help get your heart rate up.  Strengthen your muscles during the week. You don't have to lift heavy weights or grow big, bulky muscles to get stronger. Doing a few simple activities that make your muscles work against, or \"resist,\" something can help you get stronger. Aim for at least twice a week.  For example, you can:  Do push-ups or sit-ups, which use your own body weight as resistance.  Lift weights or dumbbells or use stretch bands at home or in a gym or community center.  Stretch your muscles often. Stretching will help you as you become more active. It can help you stay flexible and loosen tight muscles. It can also help improve your balance and posture and can be a great way to relax.  Be sure to stretch the muscles you'll be using when you work out. It's best to warm your muscles slightly before you stretch them. Walk or do some other light aerobic activity for a few minutes. Then start stretching.  When " "you stretch your muscles:  Do it slowly. Stretching is not about going fast or making sudden movements.  Don't push or bounce during a stretch.  Hold each stretch for at least 15 to 30 seconds, if you can. You should feel a stretch in the muscle, but not pain.  Breathe out as you do the stretch. Then breathe in as you hold the stretch. Don't hold your breath.  If you're worried about how more activity might affect your health, have a checkup before you start. Follow any special advice your doctor gives you for getting a smart start.  Where can you learn more?  Go to https://www.[employer].net/patiented  Enter W332 in the search box to learn more about \"Learning About Being Physically Active.\"  Current as of: June 6, 2023               Content Version: 13.8    1747-3879 [employer].   Care instructions adapted under license by your healthcare professional. If you have questions about a medical condition or this instruction, always ask your healthcare professional. [employer] disclaims any warranty or liability for your use of this information.         "

## 2023-12-04 NOTE — ASSESSMENT & PLAN NOTE
Still having mild symptoms with the getting up twice a night to urinate sometimes 3.  Was intolerant of tamsulosin due to significant dizziness.  Discussed controlling fluid intake to help reduce.  If that is unsuccessful and awakenings or not improving then can consider going to urology for further evaluation.

## 2023-12-04 NOTE — ASSESSMENT & PLAN NOTE
Tolerating her atorvastatin 20 mg very well.  Will recheck levels and adjust to optimize risk reduction.

## 2023-12-04 NOTE — PROGRESS NOTES
"SUBJECTIVE:   Michael is a 83 year old, presenting for the following:  Wellness Visit        12/4/2023    11:05 AM   Additional Questions   Roomed by VIRGILIO Love   Accompanied by Self         12/4/2023    11:05 AM   Patient Reported Additional Medications   Patient reports taking the following new medications N/A       Are you in the first 12 months of your Medicare coverage?  No    Denies any chest pain, shortness of breath, dyspnea exertion, palpitations, nausea or vomiting.  Denies any changes in vision or hearing, or urinary or bowel habits.        Healthy Habits:     In general, how would you rate your overall health?  Excellent    Frequency of exercise:  1 day/week    Duration of exercise:  30-45 minutes    Do you usually eat at least 4 servings of fruit and vegetables a day, include whole grains    & fiber and avoid regularly eating high fat or \"junk\" foods?  Yes    Taking medications regularly:  Yes    Barriers to taking medications:  None    Medication side effects:  Muscle aches    Ability to successfully perform activities of daily living:  No assistance needed    Home Safety:  No safety concerns identified    Hearing Impairment:  No hearing concerns    In the past 6 months, have you been bothered by leaking of urine?  No    In general, how would you rate your overall mental or emotional health?  Excellent    Additional concerns today:  No      Today's PHQ-2 Score:       12/4/2023    10:24 AM   PHQ-2 ( 1999 [employer])   Q1: Little interest or pleasure in doing things 0   Q2: Feeling down, depressed or hopeless 0   PHQ-2 Score 0   Q1: Little interest or pleasure in doing things Not at all   Q2: Feeling down, depressed or hopeless Not at all   PHQ-2 Score 0       Have you ever done Advance Care Planning? (For example, a Health Directive, POLST, or a discussion with a medical provider or your loved ones about your wishes): Yes, advance care planning is on file.      Fall risk  Fallen 2 or more times in the past year?: " No  Any fall with injury in the past year?: No    Cognitive Screening   1) Repeat 3 items (Leader, Season, Table)    2) Clock draw: ABNORMAL Hands at 10:57  3) 3 item recall: Recalls 2 objects   Results: ABNORMAL clock, 1-2 items recalled: PROBABLE COGNITIVE IMPAIRMENT, **INFORM PROVIDER**    Mini-CogTM Copyright KATRINA Porter. Licensed by the author for use in [facility]; reprinted with permission (demetra@[facility]). All rights reserved.        Reviewed and updated as needed this visit by clinical staff   Tobacco  Allergies  Meds  Problems  Med Hx  Surg Hx  Fam Hx  Soc   Hx        Reviewed and updated as needed this visit by Provider   Tobacco  Allergies  Meds  Problems  Med Hx  Surg Hx  Fam Hx         Social History     Tobacco Use    Smoking status: Never     Passive exposure: Never    Smokeless tobacco: Never   Substance Use Topics    Alcohol use: Yes     Alcohol/week: 14.0 standard drinks of alcohol     Types: 14 Glasses of wine per week             11/30/2023     8:43 AM   Alcohol Use   Prescreen: >3 drinks/day or >7 drinks/week? No     Do you have a current opioid prescription? No  Do you use any other controlled substances or medications that are not prescribed by a provider? None    Current providers sharing in care for this patient include:   Patient Care Team:  Asim Howard DO as PCP - General (Family Practice)  Asim Howard DO as Assigned PCP    The following health maintenance items are reviewed in Epic and correct as of today:  Health Maintenance   Topic Date Due    DTAP/TDAP/TD IMMUNIZATION (1 - Tdap) 01/04/2024 (Originally 7/2/2019)    RSV VACCINE (Pregnancy & 60+) (1 - 1-dose 60+ series) 01/04/2024 (Originally 6/6/2001)    COVID-19 Vaccine (4 - 2023-24 season) 01/04/2024 (Originally 9/1/2023)    MEDICARE ANNUAL WELLNESS VISIT  12/04/2024    ANNUAL REVIEW OF HM ORDERS  12/04/2024    FALL RISK ASSESSMENT  12/04/2024    ADVANCE CARE PLANNING  12/04/2028    PHQ-2  "(once per calendar year)  Completed    INFLUENZA VACCINE  Completed    Pneumococcal Vaccine: 65+ Years  Completed    ZOSTER IMMUNIZATION  Completed    IPV IMMUNIZATION  Aged Out    HPV IMMUNIZATION  Aged Out    MENINGITIS IMMUNIZATION  Aged Out    RSV MONOCLONAL ANTIBODY  Aged Out     Lab work is in process  Labs reviewed in EPIC    Review of Systems   Constitutional:  Negative for chills and fever.   HENT:  Negative for congestion, ear pain, hearing loss and sore throat.    Eyes:  Negative for pain.   Respiratory:  Negative for cough and shortness of breath.    Cardiovascular:  Negative for chest pain, palpitations and peripheral edema.   Gastrointestinal:  Negative for abdominal pain, constipation, diarrhea, heartburn, hematochezia and nausea.   Genitourinary:  Positive for frequency, impotence and urgency. Negative for dysuria, genital sores, hematuria and penile discharge.   Musculoskeletal:  Negative for arthralgias, joint swelling and myalgias.   Skin:  Negative for rash.   Neurological:  Negative for dizziness, weakness, headaches and paresthesias.   Psychiatric/Behavioral:  Negative for mood changes. The patient is not nervous/anxious.        OBJECTIVE:   /76   Pulse 53   Temp 97.8  F (36.6  C) (Oral)   Resp 12   Ht 1.676 m (5' 6\")   Wt 80.6 kg (177 lb 9.6 oz)   SpO2 97%   BMI 28.67 kg/m   Estimated body mass index is 28.67 kg/m  as calculated from the following:    Height as of this encounter: 1.676 m (5' 6\").    Weight as of this encounter: 80.6 kg (177 lb 9.6 oz).  Physical Exam  Vitals and nursing note reviewed.   Constitutional:       General: He is not in acute distress.     Appearance: Normal appearance.   HENT:      Head: Normocephalic and atraumatic.      Right Ear: Tympanic membrane, ear canal and external ear normal.      Left Ear: Tympanic membrane, ear canal and external ear normal.      Nose: Nose normal.      Mouth/Throat:      Mouth: Mucous membranes are moist.      Pharynx: " Oropharynx is clear. No oropharyngeal exudate.   Eyes:      General: No scleral icterus.     Extraocular Movements: Extraocular movements intact.      Conjunctiva/sclera: Conjunctivae normal.   Neck:      Vascular: No carotid bruit.   Cardiovascular:      Rate and Rhythm: Normal rate and regular rhythm.      Pulses: Normal pulses.      Heart sounds: Normal heart sounds. No murmur heard.     No friction rub. No gallop.   Pulmonary:      Effort: Pulmonary effort is normal.      Breath sounds: Normal breath sounds. No wheezing, rhonchi or rales.   Musculoskeletal:         General: No swelling. Normal range of motion.      Cervical back: Normal range of motion.      Right lower leg: No edema.      Left lower leg: No edema.   Skin:     General: Skin is warm and dry.      Capillary Refill: Capillary refill takes less than 2 seconds.      Coloration: Skin is not jaundiced.      Findings: No rash.   Neurological:      General: No focal deficit present.      Mental Status: He is alert and oriented to person, place, and time.      Cranial Nerves: No cranial nerve deficit.      Gait: Gait is intact. Gait normal.      Deep Tendon Reflexes:      Reflex Scores:       Bicep reflexes are 2+ on the right side and 2+ on the left side.       Patellar reflexes are 2+ on the right side and 2+ on the left side.  Psychiatric:         Mood and Affect: Mood normal.         Thought Content: Thought content normal.         ASSESSMENT / PLAN:     Problem List Items Addressed This Visit       HLD (hyperlipidemia)     Tolerating her atorvastatin 20 mg very well.  Will recheck levels and adjust to optimize risk reduction.         Relevant Orders    Lipid panel reflex to direct LDL Fasting    Comprehensive metabolic panel    Essential hypertension     Below goal of 140/90 on current regimen which does include lisinopril 10 mg daily.  Will continue at current dosing.  Continue diet and exercise.  Discussed optimizing exercise by getting at least  "150 minutes of exercise per week which can be done through walking.         Relevant Orders    Comprehensive metabolic panel    ED (erectile dysfunction)     Tolerate sildenafil very well.  No significant side effects.  Will continue as needed use.         Leukocytosis     Has been doing well.  As per plan will continue to monitor.         Relevant Orders    CBC with Platelets and Reflex to Iron Studies (Completed)    Benign prostatic hyperplasia with incomplete bladder emptying     Still having mild symptoms with the getting up twice a night to urinate sometimes 3.  Was intolerant of tamsulosin due to significant dizziness.  Discussed controlling fluid intake to help reduce.  If that is unsuccessful and awakenings or not improving then can consider going to urology for further evaluation.          Other Visit Diagnoses       Encounter for Medicare annual wellness exam    -  Primary    Relevant Orders    REVIEW OF HEALTH MAINTENANCE PROTOCOL ORDERS (Completed)    PRIMARY CARE FOLLOW-UP SCHEDULING    Diabetes mellitus screening        Relevant Orders    Comprehensive metabolic panel            Patient has been advised of split billing requirements and indicates understanding: Yes      COUNSELING:  Reviewed preventive health counseling, as reflected in patient instructions       Regular exercise       Healthy diet/nutrition       Vision screening       Dental care       Bladder control       Fall risk prevention      BMI:   Estimated body mass index is 28.67 kg/m  as calculated from the following:    Height as of this encounter: 1.676 m (5' 6\").    Weight as of this encounter: 80.6 kg (177 lb 9.6 oz).   Weight management plan: Discussed healthy diet and exercise guidelines      He reports that he has never smoked. He has never been exposed to tobacco smoke. He has never used smokeless tobacco.      Appropriate preventive services were discussed with this patient, including applicable screening as appropriate for fall " prevention, nutrition, physical activity, Tobacco-use cessation, weight loss and cognition.  Checklist reviewing preventive services available has been given to the patient.    Reviewed patients plan of care and provided an AVS. The Basic Care Plan (routine screening as documented in Health Maintenance) for Go meets the Care Plan requirement. This Care Plan has been established and reviewed with the Patient and spouse.          Asim Howard [facility]    Identified Health Risks:  I have reviewed Opioid Use Disorder and Substance Use Disorder risk factors and made any needed referrals. He is at risk for lack of exercise and has been provided with information to increase physical activity for the benefit of his well-being.

## 2023-12-05 LAB
ALBUMIN SERPL BCG-MCNC: 4.4 G/DL (ref 3.5–5.2)
ALP SERPL-CCNC: 64 U/L (ref 40–150)
ALT SERPL W P-5'-P-CCNC: 38 U/L (ref 0–70)
ANION GAP SERPL CALCULATED.3IONS-SCNC: 10 MMOL/L (ref 7–15)
AST SERPL W P-5'-P-CCNC: 34 U/L (ref 0–45)
BILIRUB SERPL-MCNC: 0.6 MG/DL
BUN SERPL-MCNC: 12.5 MG/DL (ref 8–23)
CALCIUM SERPL-MCNC: 9.7 MG/DL (ref 8.8–10.2)
CHLORIDE SERPL-SCNC: 99 MMOL/L (ref 98–107)
CHOLEST SERPL-MCNC: 172 MG/DL
CREAT SERPL-MCNC: 0.88 MG/DL (ref 0.67–1.17)
DEPRECATED HCO3 PLAS-SCNC: 26 MMOL/L (ref 22–29)
EGFRCR SERPLBLD CKD-EPI 2021: 86 ML/MIN/1.73M2
GLUCOSE SERPL-MCNC: 92 MG/DL (ref 70–99)
HDLC SERPL-MCNC: 59 MG/DL
LDLC SERPL CALC-MCNC: 86 MG/DL
NONHDLC SERPL-MCNC: 113 MG/DL
POTASSIUM SERPL-SCNC: 5 MMOL/L (ref 3.4–5.3)
PROT SERPL-MCNC: 7.5 G/DL (ref 6.4–8.3)
SODIUM SERPL-SCNC: 135 MMOL/L (ref 135–145)
TRIGL SERPL-MCNC: 135 MG/DL

## 2024-01-24 DIAGNOSIS — I10 ESSENTIAL HYPERTENSION: ICD-10-CM

## 2024-01-24 RX ORDER — LISINOPRIL 10 MG/1
10 TABLET ORAL DAILY
Qty: 90 TABLET | Refills: 3 | Status: SHIPPED | OUTPATIENT
Start: 2024-01-24

## 2024-01-26 DIAGNOSIS — N52.9 ERECTILE DYSFUNCTION, UNSPECIFIED ERECTILE DYSFUNCTION TYPE: ICD-10-CM

## 2024-01-26 RX ORDER — SILDENAFIL 100 MG/1
100 TABLET, FILM COATED ORAL DAILY PRN
Qty: 9 TABLET | Refills: 0 | Status: SHIPPED | OUTPATIENT
Start: 2024-01-26 | End: 2024-07-25

## 2024-01-27 DIAGNOSIS — E78.5 HYPERLIPIDEMIA, UNSPECIFIED HYPERLIPIDEMIA TYPE: ICD-10-CM

## 2024-01-29 RX ORDER — ATORVASTATIN CALCIUM 20 MG/1
20 TABLET, FILM COATED ORAL AT BEDTIME
Qty: 90 TABLET | Refills: 2 | Status: SHIPPED | OUTPATIENT
Start: 2024-01-29

## 2024-07-25 DIAGNOSIS — N52.9 ERECTILE DYSFUNCTION, UNSPECIFIED ERECTILE DYSFUNCTION TYPE: ICD-10-CM

## 2024-07-25 RX ORDER — SILDENAFIL 100 MG/1
100 TABLET, FILM COATED ORAL DAILY PRN
Qty: 9 TABLET | Refills: 0 | Status: SHIPPED | OUTPATIENT
Start: 2024-07-25 | End: 2024-07-31

## 2024-07-31 DIAGNOSIS — N52.9 ERECTILE DYSFUNCTION, UNSPECIFIED ERECTILE DYSFUNCTION TYPE: ICD-10-CM

## 2024-07-31 RX ORDER — SILDENAFIL 100 MG/1
100 TABLET, FILM COATED ORAL DAILY PRN
Qty: 9 TABLET | Refills: 0 | Status: SHIPPED | OUTPATIENT
Start: 2024-07-31

## 2024-10-20 DIAGNOSIS — E78.5 HYPERLIPIDEMIA, UNSPECIFIED HYPERLIPIDEMIA TYPE: ICD-10-CM

## 2024-10-21 RX ORDER — ATORVASTATIN CALCIUM 20 MG/1
20 TABLET, FILM COATED ORAL AT BEDTIME
Qty: 90 TABLET | Refills: 0 | Status: SHIPPED | OUTPATIENT
Start: 2024-10-21

## 2024-12-06 SDOH — HEALTH STABILITY: PHYSICAL HEALTH: ON AVERAGE, HOW MANY MINUTES DO YOU ENGAGE IN EXERCISE AT THIS LEVEL?: 0 MIN

## 2024-12-06 SDOH — HEALTH STABILITY: PHYSICAL HEALTH: ON AVERAGE, HOW MANY DAYS PER WEEK DO YOU ENGAGE IN MODERATE TO STRENUOUS EXERCISE (LIKE A BRISK WALK)?: 0 DAYS

## 2024-12-06 ASSESSMENT — SOCIAL DETERMINANTS OF HEALTH (SDOH): HOW OFTEN DO YOU GET TOGETHER WITH FRIENDS OR RELATIVES?: ONCE A WEEK

## 2024-12-10 ENCOUNTER — OFFICE VISIT (OUTPATIENT)
Dept: FAMILY MEDICINE | Facility: CLINIC | Age: 84
End: 2024-12-10
Attending: FAMILY MEDICINE
Payer: MEDICARE

## 2024-12-10 VITALS
HEIGHT: 66 IN | OXYGEN SATURATION: 97 % | SYSTOLIC BLOOD PRESSURE: 138 MMHG | BODY MASS INDEX: 27.37 KG/M2 | TEMPERATURE: 98 F | RESPIRATION RATE: 16 BRPM | HEART RATE: 52 BPM | WEIGHT: 170.3 LBS | DIASTOLIC BLOOD PRESSURE: 78 MMHG

## 2024-12-10 DIAGNOSIS — E55.9 HYPOVITAMINOSIS D: ICD-10-CM

## 2024-12-10 DIAGNOSIS — E78.5 HYPERLIPIDEMIA, UNSPECIFIED HYPERLIPIDEMIA TYPE: ICD-10-CM

## 2024-12-10 DIAGNOSIS — I10 ESSENTIAL HYPERTENSION: ICD-10-CM

## 2024-12-10 DIAGNOSIS — N52.9 ERECTILE DYSFUNCTION, UNSPECIFIED ERECTILE DYSFUNCTION TYPE: ICD-10-CM

## 2024-12-10 DIAGNOSIS — D72.829 LEUKOCYTOSIS, UNSPECIFIED TYPE: ICD-10-CM

## 2024-12-10 DIAGNOSIS — Z00.00 ENCOUNTER FOR MEDICARE ANNUAL WELLNESS EXAM: Primary | ICD-10-CM

## 2024-12-10 LAB
ALT SERPL W P-5'-P-CCNC: 18 U/L (ref 0–70)
ANION GAP SERPL CALCULATED.3IONS-SCNC: 11 MMOL/L (ref 7–15)
BUN SERPL-MCNC: 13.4 MG/DL (ref 8–23)
CALCIUM SERPL-MCNC: 9.4 MG/DL (ref 8.8–10.4)
CHLORIDE SERPL-SCNC: 100 MMOL/L (ref 98–107)
CHOLEST SERPL-MCNC: 147 MG/DL
CREAT SERPL-MCNC: 0.91 MG/DL (ref 0.67–1.17)
EGFRCR SERPLBLD CKD-EPI 2021: 84 ML/MIN/1.73M2
ERYTHROCYTE [DISTWIDTH] IN BLOOD BY AUTOMATED COUNT: 11.6 % (ref 10–15)
FASTING STATUS PATIENT QL REPORTED: YES
FASTING STATUS PATIENT QL REPORTED: YES
GLUCOSE SERPL-MCNC: 88 MG/DL (ref 70–99)
HCO3 SERPL-SCNC: 24 MMOL/L (ref 22–29)
HCT VFR BLD AUTO: 47.4 % (ref 40–53)
HDLC SERPL-MCNC: 58 MG/DL
HGB BLD-MCNC: 15.4 G/DL (ref 13.3–17.7)
HOLD SPECIMEN: NORMAL
LDLC SERPL CALC-MCNC: 72 MG/DL
MCH RBC QN AUTO: 30.1 PG (ref 26.5–33)
MCHC RBC AUTO-ENTMCNC: 32.5 G/DL (ref 31.5–36.5)
MCV RBC AUTO: 93 FL (ref 78–100)
NONHDLC SERPL-MCNC: 89 MG/DL
PLATELET # BLD AUTO: 270 10E3/UL (ref 150–450)
POTASSIUM SERPL-SCNC: 4.7 MMOL/L (ref 3.4–5.3)
RBC # BLD AUTO: 5.12 10E6/UL (ref 4.4–5.9)
SODIUM SERPL-SCNC: 135 MMOL/L (ref 135–145)
TRIGL SERPL-MCNC: 84 MG/DL
VIT D+METAB SERPL-MCNC: 39 NG/ML (ref 20–50)
WBC # BLD AUTO: 8.3 10E3/UL (ref 4–11)

## 2024-12-10 PROCEDURE — 80048 BASIC METABOLIC PNL TOTAL CA: CPT | Performed by: FAMILY MEDICINE

## 2024-12-10 PROCEDURE — 85027 COMPLETE CBC AUTOMATED: CPT | Performed by: FAMILY MEDICINE

## 2024-12-10 PROCEDURE — 82306 VITAMIN D 25 HYDROXY: CPT | Performed by: FAMILY MEDICINE

## 2024-12-10 PROCEDURE — 84460 ALANINE AMINO (ALT) (SGPT): CPT | Performed by: FAMILY MEDICINE

## 2024-12-10 PROCEDURE — 36415 COLL VENOUS BLD VENIPUNCTURE: CPT | Performed by: FAMILY MEDICINE

## 2024-12-10 PROCEDURE — 99214 OFFICE O/P EST MOD 30 MIN: CPT | Mod: 25 | Performed by: FAMILY MEDICINE

## 2024-12-10 PROCEDURE — 80061 LIPID PANEL: CPT | Performed by: FAMILY MEDICINE

## 2024-12-10 PROCEDURE — G0439 PPPS, SUBSEQ VISIT: HCPCS | Performed by: FAMILY MEDICINE

## 2024-12-10 RX ORDER — LISINOPRIL 20 MG/1
20 TABLET ORAL DAILY
Qty: 90 TABLET | Refills: 3 | Status: SHIPPED | OUTPATIENT
Start: 2024-12-10

## 2024-12-10 RX ORDER — SILDENAFIL 100 MG/1
100 TABLET, FILM COATED ORAL DAILY PRN
Qty: 9 TABLET | Refills: 4 | Status: SHIPPED | OUTPATIENT
Start: 2024-12-10

## 2024-12-10 NOTE — PROGRESS NOTES
"[facility] Care Visit  [facility]  Dec 10, 2024      Assessment & Plan   Assessment & Plan  Encounter for Medicare annual wellness exam    Orders:    PRIMARY CARE FOLLOW-UP SCHEDULING    Erectile dysfunction, unspecified erectile dysfunction type  Responding well to sildenafil when needed with little to no side effects.  Continue current use    Orders:    sildenafil (VIAGRA) 100 MG tablet; Take 1 tablet (100 mg) by mouth daily as needed.    Essential hypertension  Not optimally controlled by home numbers.  Increase lisinopril to 20 mg.  Continue to track home numbers.  Goal is for systolic to average below 140 and ideally less than 130.  Diet and exercise reviewed.    Orders:    BASIC METABOLIC PANEL; Future    lisinopril (ZESTRIL) 20 MG tablet; Take 1 tablet (20 mg) by mouth daily.    BASIC METABOLIC PANEL    Hyperlipidemia, unspecified hyperlipidemia type  Tolerating atorvastatin well.  Will recheck levels and adjust as needed to maximize optimization of risk reduction    Orders:    Lipid panel reflex to direct LDL Fasting; Future    ALT; Future    Lipid panel reflex to direct LDL Fasting    ALT    Leukocytosis, unspecified type  As per plan continue to follow CBC    Orders:    CBC with Platelets and Reflex to Iron Studies; Future    CBC with Platelets and Reflex to Iron Studies    Hypovitaminosis D - Vitamin D [place] certainly [place]  Since he is taking a supplement, will check levels to rule out possibility of high levels    Orders:    Vitamin D Deficiency; Future    Vitamin D Deficiency      Patient has been advised of split billing requirements and indicates understanding: Yes       BMI  Estimated body mass index is 27.49 kg/m  as calculated from the following:    Height as of this encounter: 1.676 m (5' 6\").    Weight as of this encounter: 77.2 kg (170 lb 4.8 oz).       Counseling  Appropriate preventive " services were addressed with this patient via screening, questionnaire, or discussion as appropriate for fall prevention, nutrition, physical activity, Tobacco-use cessation, social engagement, weight loss and cognition.  Checklist reviewing preventive services available has been given to the patient.  Reviewed patient's diet, addressing concerns and/or questions.   The patient was provided with written information regarding signs of hearing loss.     Regular exercise  See Patient Instructions      Subjective   Michael is a 84 year old, presenting for the following:  Wellness Visit        12/10/2024     9:57 AM   Additional Questions   Roomed by VIRGILIO Love   Accompanied by Self         12/10/2024     9:57 AM   Patient Reported Additional Medications   Patient reports taking the following new medications N/A     Denies any chest pain, shortness of breath, dyspnea exertion, palpitations, nausea or vomiting.  Denies any changes in vision or hearing, or urinary or bowel habits.            Health Care Directive  Patient has a Health Care Directive on file  Advance care planning document is on file and is current.      12/6/2024   General Health   How would you rate your overall physical health? Good   Feel stress (tense, anxious, or unable to sleep) Not at all            12/6/2024   Nutrition   Diet: Regular (no restrictions)            12/6/2024   Exercise   Days per week of moderate/strenous exercise 0 days   Average minutes spent exercising at this level 0 min      (!) EXERCISE CONCERN      12/6/2024   Social Factors   Frequency of gathering with friends or relatives Once a week   Worry food won't last until get money to buy more No   Food not last or not have enough money for food? No   Do you have housing? (Housing is defined as stable permanent housing and does not include staying ouside in a car, in a tent, in an abandoned building, in an overnight shelter, or couch-surfing.) Yes   Are you worried about losing your  housing? No   Lack of transportation? No   Unable to get utilities (heat,electricity)? No            12/6/2024   Fall Risk   Fallen 2 or more times in the past year? No     No    Trouble with walking or balance? No     No        Patient-reported    Multiple values from one day are sorted in reverse-chronological order          12/6/2024   Activities of Daily Living- Home Safety   Needs help with the following daily activites None of the above   Safety concerns in the home None of the above            12/6/2024   Dental   Dentist two times every year? Yes            12/6/2024   Hearing Screening   Hearing concerns? (!) I FEEL THAT PEOPLE ARE MUMBLING OR NOT SPEAKING CLEARLY.    (!) I NEED TO ASK PEOPLE TO SPEAK UP OR REPEAT THEMSELVES.       Multiple values from one day are sorted in reverse-chronological order         12/6/2024   Driving Risk Screening   Patient/family members have concerns about driving No            12/6/2024   General Alertness/Fatigue Screening   Have you been more tired than usual lately? No            12/6/2024   Urinary Incontinence Screening   Bothered by leaking urine in past 6 months No            12/6/2024   TB Screening   Were you born outside of the [place]? No            Today's PHQ-2 Score:       12/9/2024     6:07 PM   PHQ-2 ( 1999 [employer])   Q1: Little interest or pleasure in doing things 0    Q2: Feeling down, depressed or hopeless 0    PHQ-2 Score 0    Q1: Little interest or pleasure in doing things Not at all   Q2: Feeling down, depressed or hopeless Not at all   PHQ-2 Score 0       Patient-reported           12/6/2024   Substance Use   Alcohol more than 3/day or more than 7/wk No   Do you have a current opioid prescription? No   How severe/bad is pain from 1 to 10? 0/10 (No Pain)   Do you use any other substances recreationally? No        Social History     Tobacco Use    Smoking status: Never     Passive exposure: Never    Smokeless tobacco: Never   Vaping Use    Vaping status: Never  "Used   Substance Use Topics    Alcohol use: Yes     Alcohol/week: 14.0 standard drinks of alcohol     Types: 14 Glasses of wine per week    Drug use: Never               Reviewed and updated as needed this visit by Provider   Tobacco  Allergies  Meds  Problems  Med Hx  Surg Hx  Fam Hx            Current providers sharing in care for this patient include:  Patient Care Team:  Asim Howard DO as PCP - General (Family Practice)  Asim Howadr DO as Assigned PCP    The following health maintenance items are reviewed in Epic and correct as of today:  Health Maintenance   Topic Date Due    DTAP/TDAP/TD IMMUNIZATION (1 - Tdap) 01/10/2025 (Originally 7/2/2019)    RSV VACCINE (1 - 1-dose 75+ series) 01/10/2025 (Originally 6/6/2016)    COVID-19 Vaccine (4 - 2024-25 season) 01/10/2025 (Originally 9/1/2024)    MEDICARE ANNUAL WELLNESS VISIT  12/10/2025    BMP  12/10/2025    LIPID  12/10/2025    ANNUAL REVIEW OF HM ORDERS  12/10/2025    FALL RISK ASSESSMENT  12/10/2025    ADVANCE CARE PLANNING  12/04/2028    PHQ-2 (once per calendar year)  Completed    INFLUENZA VACCINE  Completed    Pneumococcal Vaccine: 65+ Years  Completed    ZOSTER IMMUNIZATION  Completed    HPV IMMUNIZATION  Aged Out    MENINGITIS IMMUNIZATION  Aged Out    RSV MONOCLONAL ANTIBODY  Aged Out          Objective    Exam  /78   Pulse 52   Temp 98  F (36.7  C) (Oral)   Resp 16   Ht 1.676 m (5' 6\")   Wt 77.2 kg (170 lb 4.8 oz)   SpO2 97%   BMI 27.49 kg/m     Estimated body mass index is 27.49 kg/m  as calculated from the following:    Height as of this encounter: 1.676 m (5' 6\").    Weight as of this encounter: 77.2 kg (170 lb 4.8 oz).    Physical Exam  Vitals and nursing note reviewed.   Constitutional:       General: He is not in acute distress.     Appearance: Normal appearance.   HENT:      Head: Normocephalic and atraumatic.      Right Ear: Tympanic membrane, ear canal and external ear normal.      Left Ear: Tympanic " membrane, ear canal and external ear normal.      Nose: Nose normal.      Mouth/Throat:      Mouth: Mucous membranes are moist.      Pharynx: Oropharynx is clear. No oropharyngeal exudate.   Eyes:      General: No scleral icterus.     Extraocular Movements: Extraocular movements intact.      Conjunctiva/sclera: Conjunctivae normal.   Neck:      Vascular: No carotid bruit.   Cardiovascular:      Rate and Rhythm: Normal rate and regular rhythm.      Pulses: Normal pulses.      Heart sounds: Normal heart sounds. No murmur heard.     No friction rub. No gallop.   Pulmonary:      Effort: Pulmonary effort is normal.      Breath sounds: Normal breath sounds. No wheezing, rhonchi or rales.   Musculoskeletal:         General: No swelling. Normal range of motion.      Cervical back: Normal range of motion.      Right lower leg: No edema.      Left lower leg: No edema.   Skin:     General: Skin is warm and dry.      Capillary Refill: Capillary refill takes less than 2 seconds.      Coloration: Skin is not jaundiced.      Findings: No rash.   Neurological:      General: No focal deficit present.      Mental Status: He is alert and oriented to person, place, and time.      Cranial Nerves: No cranial nerve deficit.      Gait: Gait is intact. Gait normal.      Deep Tendon Reflexes:      Reflex Scores:       Bicep reflexes are 2+ on the right side and 2+ on the left side.       Patellar reflexes are 2+ on the right side and 2+ on the left side.  Psychiatric:         Mood and Affect: Mood normal.         Thought Content: Thought content normal.               12/10/2024   Mini Cog   Clock Draw Score 0 Abnormal   3 Item Recall 1 object recalled   Mini Cog Total Score 1                 Signed Electronically by: Asim Howard DO

## 2024-12-11 NOTE — ASSESSMENT & PLAN NOTE
Tolerating atorvastatin well.  Will recheck levels and adjust as needed to maximize optimization of risk reduction    Orders:    Lipid panel reflex to direct LDL Fasting; Future    ALT; Future    Lipid panel reflex to direct LDL Fasting    ALT

## 2024-12-11 NOTE — ASSESSMENT & PLAN NOTE
Not optimally controlled by home numbers.  Increase lisinopril to 20 mg.  Continue to track home numbers.  Goal is for systolic to average below 140 and ideally less than 130.  Diet and exercise reviewed.    Orders:    BASIC METABOLIC PANEL; Future    lisinopril (ZESTRIL) 20 MG tablet; Take 1 tablet (20 mg) by mouth daily.    BASIC METABOLIC PANEL

## 2024-12-11 NOTE — ASSESSMENT & PLAN NOTE
Responding well to sildenafil when needed with little to no side effects.  Continue current use    Orders:    sildenafil (VIAGRA) 100 MG tablet; Take 1 tablet (100 mg) by mouth daily as needed.

## 2024-12-11 NOTE — ASSESSMENT & PLAN NOTE
As per plan continue to follow CBC    Orders:    CBC with Platelets and Reflex to Iron Studies; Future    CBC with Platelets and Reflex to Iron Studies

## 2024-12-11 NOTE — PATIENT INSTRUCTIONS
Patient Education   Preventive Care Advice   This is general advice given by our system to help you stay healthy. However, your care team may have specific advice just for you. Please talk to your care team about your preventive care needs.  Nutrition  Eat 5 or more servings of fruits and vegetables each day.  Try wheat bread, brown rice and whole grain pasta (instead of white bread, rice, and pasta).  Get enough calcium and vitamin D. Check the label on foods and aim for 100% of the RDA (recommended daily allowance).  Lifestyle  Exercise at least 150 minutes each week  (30 minutes a day, 5 days a week).  Do muscle strengthening activities 2 days a week. These help control your weight and prevent disease.  No smoking.  Wear sunscreen to prevent skin cancer.  Have a dental exam and cleaning every 6 months.  Yearly exams  See your health care team every year to talk about:  Any changes in your health.  Any medicines your care team has prescribed.  Preventive care, family planning, and ways to prevent chronic diseases.  Shots (vaccines)   HPV shots (up to age 26), if you've never had them before.  Hepatitis B shots (up to age 59), if you've never had them before.  COVID-19 shot: Get this shot when it's due.  Flu shot: Get a flu shot every year.  Tetanus shot: Get a tetanus shot every 10 years.  Pneumococcal, hepatitis A, and RSV shots: Ask your care team if you need these based on your risk.  Shingles shot (for age 50 and up)  General health tests  Diabetes screening:  Starting at age 35, Get screened for diabetes at least every 3 years.  If you are younger than age 35, ask your care team if you should be screened for diabetes.  Cholesterol test: At age 39, start having a cholesterol test every 5 years, or more often if advised.  Bone density scan (DEXA): At age 50, ask your care team if you should have this scan for osteoporosis (brittle bones).  Hepatitis C: Get tested at least once in your life.  STIs (sexually  transmitted infections)  Before age 24: Ask your care team if you should be screened for STIs.  After age 24: Get screened for STIs if you're at risk. You are at risk for STIs (including HIV) if:  You are sexually active with more than one person.  You don't use condoms every time.  You or a partner was diagnosed with a sexually transmitted infection.  If you are at risk for HIV, ask about PrEP medicine to prevent HIV.  Get tested for HIV at least once in your life, whether you are at risk for HIV or not.  Cancer screening tests  Cervical cancer screening: If you have a cervix, begin getting regular cervical cancer screening tests starting at age 21.  Breast cancer scan (mammogram): If you've ever had breasts, begin having regular mammograms starting at age 40. This is a scan to check for breast cancer.  Colon cancer screening: It is important to start screening for colon cancer at age 45.  Have a colonoscopy test every 10 years (or more often if you're at risk) Or, ask your provider about stool tests like a FIT test every year or Cologuard test every 3 years.  To learn more about your testing options, visit:   .  For help making a decision, visit:   https://bit.ly/xk85116.  Prostate cancer screening test: If you have a prostate, ask your care team if a prostate cancer screening test (PSA) at age 55 is right for you.  Lung cancer screening: If you are a current or former smoker ages 50 to 80, ask your care team if ongoing lung cancer screenings are right for you.  For informational purposes only. Not to replace the advice of your health care provider. Copyright   2023 [facility] [employer] All rights reserved. Clinically reviewed by the [facility] Transitions Program. [employer] 307499 - REV 01/24.  Hearing Loss: Care Instructions  Overview     Hearing loss is a sudden or slow decrease in how well you hear. It can range from slight to profound. Permanent hearing loss can occur with aging. It also can  happen when you are exposed long-term to loud noise. Examples include listening to loud music, riding motorcycles, or being around other loud machines.  Hearing loss can affect your work and home life. It can make you feel lonely or depressed. You may feel that you have lost your independence. But hearing aids and other devices can help you hear better and feel connected to others.  Follow-up care is a key part of your treatment and safety. Be sure to make and go to all appointments, and call your doctor if you are having problems. It's also a good idea to know your test results and keep a list of the medicines you take.  How can you care for yourself at home?  Avoid loud noises whenever possible. This helps keep your hearing from getting worse.  Always wear hearing protection around loud noises.  Wear a hearing aid as directed.  A professional can help you pick a hearing aid that will work best for you.  You can also get hearing aids over the counter for mild to moderate hearing loss.  Have hearing tests as your doctor suggests. They can show whether your hearing has changed. Your hearing aid may need to be adjusted.  Use other devices as needed. These may include:  Telephone amplifiers and hearing aids that can connect to a television, stereo, radio, or microphone.  Devices that use lights or vibrations. These alert you to the doorbell, a ringing telephone, or a baby monitor.  Television closed-captioning. This shows the words at the bottom of the screen. Most new TVs can do this.  TTY (text telephone). This lets you type messages back and forth on the telephone instead of talking or listening. These devices are also called TDD. When messages are typed on the keyboard, they are sent over the phone line to a receiving TTY. The message is shown on a monitor.  Use text messaging, social media, and email if it is hard for you to communicate by telephone.  Try to learn a listening technique called speechreading. It is  "not lipreading. You pay attention to people's gestures, expressions, posture, and tone of voice. These clues can help you understand what a person is saying. Face the person you are talking to, and have them face you. Make sure the lighting is good. You need to see the other person's face clearly.  Think about counseling if you need help to adjust to your hearing loss.  When should you call for help?  Watch closely for changes in your health, and be sure to contact your doctor if:    You think your hearing is getting worse.     You have new symptoms, such as dizziness or nausea.   Where can you learn more?  Go to https://www.[employer].net/patiented  Enter R798 in the search box to learn more about \"Hearing Loss: Care Instructions.\"  Current as of: September 27, 2023  Content Version: 14.2 2024 [facility] [employer].   Care instructions adapted under license by your healthcare professional. If you have questions about a medical condition or this instruction, always ask your healthcare professional. [employer] disclaims any warranty or liability for your use of this information.       "

## 2024-12-11 NOTE — ASSESSMENT & PLAN NOTE
Since he is taking a supplement, will check levels to rule out possibility of high levels    Orders:    Vitamin D Deficiency; Future    Vitamin D Deficiency

## 2025-03-17 NOTE — TELEPHONE ENCOUNTER
"Last Written Prescription Date:  8/23/21  Last Fill Quantity: 90,  # refills: 3   Last office visit provider:  10/11/21     Requested Prescriptions   Pending Prescriptions Disp Refills     atorvastatin (LIPITOR) 20 MG tablet [Pharmacy Med Name: ATORVASTATIN 20 MG TABLET] 90 tablet 3     Sig: TAKE 1 TABLET BY MOUTH AT BEDTIME       Statins Protocol Passed - 8/20/2022  7:17 AM        Passed - LDL on file in past 12 months     Recent Labs   Lab Test 10/11/21  0857   LDL 76             Passed - No abnormal creatine kinase in past 12 months     No lab results found.             Passed - Recent (12 mo) or future (30 days) visit within the authorizing provider's specialty     Patient has had an office visit with the authorizing provider or a provider within the authorizing providers department within the previous 12 mos or has a future within next 30 days. See \"Patient Info\" tab in inbasket, or \"Choose Columns\" in Meds & Orders section of the refill encounter.              Passed - Medication is active on med list        Passed - Patient is age 18 or older             Salina Collins RN 08/20/22 6:57 PM  " Try calling patient again to let her know the message below---    Attempted to call patient x 2---no answer--phone didn't ring at all..

## 2025-04-16 DIAGNOSIS — E78.5 HYPERLIPIDEMIA, UNSPECIFIED HYPERLIPIDEMIA TYPE: ICD-10-CM

## 2025-04-16 RX ORDER — ATORVASTATIN CALCIUM 20 MG/1
20 TABLET, FILM COATED ORAL AT BEDTIME
Qty: 90 TABLET | Refills: 0 | Status: SHIPPED | OUTPATIENT
Start: 2025-04-16

## 2025-04-16 NOTE — TELEPHONE ENCOUNTER
Medication Request  Medication name: atorvastatin (LIPITOR) 20 MG tablet   Requested Pharmacy: [id number]  When was patient last seen for this?:  12/10/24  Patient offered appointment:  Yes, est care scheduled with Dr. Taylor 6/25/25  Okay to leave a detailed message: yes

## 2025-06-25 ENCOUNTER — OFFICE VISIT (OUTPATIENT)
Dept: FAMILY MEDICINE | Facility: CLINIC | Age: 85
End: 2025-06-25
Payer: MEDICARE

## 2025-06-25 VITALS
WEIGHT: 168.4 LBS | SYSTOLIC BLOOD PRESSURE: 151 MMHG | HEART RATE: 53 BPM | RESPIRATION RATE: 18 BRPM | TEMPERATURE: 97.7 F | OXYGEN SATURATION: 100 % | DIASTOLIC BLOOD PRESSURE: 83 MMHG | HEIGHT: 66 IN | BODY MASS INDEX: 27.06 KG/M2

## 2025-06-25 DIAGNOSIS — E78.5 HYPERLIPIDEMIA, UNSPECIFIED HYPERLIPIDEMIA TYPE: ICD-10-CM

## 2025-06-25 DIAGNOSIS — R35.1 BENIGN PROSTATIC HYPERPLASIA WITH NOCTURIA: ICD-10-CM

## 2025-06-25 DIAGNOSIS — I10 ESSENTIAL HYPERTENSION: Primary | ICD-10-CM

## 2025-06-25 DIAGNOSIS — N40.1 BENIGN PROSTATIC HYPERPLASIA WITH NOCTURIA: ICD-10-CM

## 2025-06-25 PROBLEM — R39.14 BENIGN PROSTATIC HYPERPLASIA WITH INCOMPLETE BLADDER EMPTYING: Status: RESOLVED | Noted: 2021-05-03 | Resolved: 2025-06-25

## 2025-06-25 PROBLEM — D72.829 LEUKOCYTOSIS: Status: RESOLVED | Noted: 2021-08-23 | Resolved: 2025-06-25

## 2025-06-25 PROBLEM — R55 NEAR SYNCOPE: Status: RESOLVED | Noted: 2021-08-22 | Resolved: 2025-06-25

## 2025-06-25 PROBLEM — R00.1 SINUS BRADYCARDIA: Status: RESOLVED | Noted: 2021-08-22 | Resolved: 2025-06-25

## 2025-06-25 PROCEDURE — 80069 RENAL FUNCTION PANEL: CPT | Performed by: FAMILY MEDICINE

## 2025-06-25 PROCEDURE — 36415 COLL VENOUS BLD VENIPUNCTURE: CPT | Performed by: FAMILY MEDICINE

## 2025-06-25 PROCEDURE — 3077F SYST BP >= 140 MM HG: CPT | Performed by: FAMILY MEDICINE

## 2025-06-25 PROCEDURE — 99214 OFFICE O/P EST MOD 30 MIN: CPT | Performed by: FAMILY MEDICINE

## 2025-06-25 PROCEDURE — G2211 COMPLEX E/M VISIT ADD ON: HCPCS | Performed by: FAMILY MEDICINE

## 2025-06-25 PROCEDURE — 3079F DIAST BP 80-89 MM HG: CPT | Performed by: FAMILY MEDICINE

## 2025-06-25 PROCEDURE — 84154 ASSAY OF PSA FREE: CPT | Performed by: FAMILY MEDICINE

## 2025-06-25 PROCEDURE — 84153 ASSAY OF PSA TOTAL: CPT | Performed by: FAMILY MEDICINE

## 2025-06-25 RX ORDER — ATORVASTATIN CALCIUM 20 MG/1
20 TABLET, FILM COATED ORAL AT BEDTIME
Qty: 90 TABLET | Refills: 3 | Status: SHIPPED | OUTPATIENT
Start: 2025-06-25

## 2025-06-25 RX ORDER — TAMSULOSIN HYDROCHLORIDE 0.4 MG/1
0.4 CAPSULE ORAL DAILY
Qty: 30 CAPSULE | Refills: 1 | Status: SHIPPED | OUTPATIENT
Start: 2025-06-25

## 2025-06-25 RX ORDER — LISINOPRIL AND HYDROCHLOROTHIAZIDE 12.5; 2 MG/1; MG/1
1 TABLET ORAL DAILY
Qty: 90 TABLET | Refills: 3 | Status: SHIPPED | OUTPATIENT
Start: 2025-06-25

## 2025-06-25 NOTE — PROGRESS NOTES
"  Assessment & Plan   Assessment & Plan  Essential hypertension  I did recommend an adjustment to his blood pressure medication as his blood pressure has been elevated over the past week year and a half.  I added hydrochlorothiazide to his lisinopril and prescribe Zestoretic 20/12.5 mg daily and discontinued his plain lisinopril.  Orders:    lisinopril-hydrochlorothiazide (ZESTORETIC) 20-12.5 MG tablet; Take 1 tablet by mouth daily.    Renal panel (Alb, BUN, Ca, Cl, CO2, Creat, Gluc, Phos, K, Na); Future    Benign prostatic hyperplasia with nocturia  The patient describes nocturia getting up often 3 times a night which affects his sleep and then resulting in daytime fatigue.  I recommended we give a trial to Flomax 0.4 mg daily.  He did try that once in the past but really cannot remember and it looks like he perhaps had some slight fatigue with it.  I think it is worth another try.  I also checked a PSA test today.  Orders:    tamsulosin (FLOMAX) 0.4 MG capsule; Take 1 capsule (0.4 mg) by mouth daily.    PSA, total and free; Future    Hyperlipidemia, unspecified hyperlipidemia type  Continue atorvastatin 20 mg daily.  Orders:    atorvastatin (LIPITOR) 20 MG tablet; Take 1 tablet (20 mg) by mouth at bedtime.           BMI  Estimated body mass index is 27.18 kg/m  as calculated from the following:    Height as of this encounter: 1.676 m (5' 6\").    Weight as of this encounter: 76.4 kg (168 lb 6.4 oz).       Camelia Rodriguez is a 85 year old who presents today accompanied by his wife to establish care and for medication check visit.  The patient is doing well overall and really has no specific complaints or concerns today.  He is taking his medication as directed without any side effects.  Establish Care      6/25/2025     3:23 PM   Additional Questions   Roomed by Constanza Rose         6/25/2025   Forms   Any forms needing to be completed Yes         Objective    BP (!) 151/83 (BP Location: Left arm, Patient " Position: Left side, Cuff Size: Adult Small)   Pulse 53   Temp 97.7  F (36.5  C)   Resp 18   SpO2 100%   There is no height or weight on file to calculate BMI.  Physical Exam   GENERAL: alert and no distress  RESP: lungs clear to auscultation - no rales, rhonchi or wheezes  CV: regular rate and rhythm, normal S1 S2, no S3 or S4, no murmur, click or rub, no peripheral edema     The longitudinal plan of care for the diagnosis(es)/condition(s) as documented were addressed during this visit. Due to the added complexity in care, I will continue to support Michael in the subsequent management and with ongoing continuity of care.        Signed Electronically by: MIKE SUMMERS MD    Answers submitted by the patient for this visit:  General Questionnaire (Submitted on 6/20/2025)  Chief Complaint: Chronic problems general questions HPI Form  What is the reason for your visit today? : Establish primary care  How many days per week do you miss taking your medication?: 0  Questionnaire about: Chronic problems general questions HPI Form (Submitted on 6/20/2025)  Chief Complaint: Chronic problems general questions HPI Form

## 2025-06-25 NOTE — ASSESSMENT & PLAN NOTE
I did recommend an adjustment to his blood pressure medication as his blood pressure has been elevated over the past week year and a half.  I added hydrochlorothiazide to his lisinopril and prescribe Zestoretic 20/12.5 mg daily and discontinued his plain lisinopril.  Orders:    lisinopril-hydrochlorothiazide (ZESTORETIC) 20-12.5 MG tablet; Take 1 tablet by mouth daily.    Renal panel (Alb, BUN, Ca, Cl, CO2, Creat, Gluc, Phos, K, Na); Future

## 2025-06-25 NOTE — ASSESSMENT & PLAN NOTE
The patient describes nocturia getting up often 3 times a night which affects his sleep and then resulting in daytime fatigue.  I recommended we give a trial to Flomax 0.4 mg daily.  He did try that once in the past but really cannot remember and it looks like he perhaps had some slight fatigue with it.  I think it is worth another try.  I also checked a PSA test today.  Orders:    tamsulosin (FLOMAX) 0.4 MG capsule; Take 1 capsule (0.4 mg) by mouth daily.    PSA, total and free; Future

## 2025-06-26 ENCOUNTER — RESULTS FOLLOW-UP (OUTPATIENT)
Dept: FAMILY MEDICINE | Facility: CLINIC | Age: 85
End: 2025-06-26

## 2025-06-26 LAB
ALBUMIN SERPL BCG-MCNC: 4.1 G/DL (ref 3.5–5.2)
ANION GAP SERPL CALCULATED.3IONS-SCNC: 9 MMOL/L (ref 7–15)
BUN SERPL-MCNC: 12.8 MG/DL (ref 8–23)
CALCIUM SERPL-MCNC: 9.1 MG/DL (ref 8.8–10.4)
CHLORIDE SERPL-SCNC: 100 MMOL/L (ref 98–107)
CREAT SERPL-MCNC: 0.94 MG/DL (ref 0.67–1.17)
EGFRCR SERPLBLD CKD-EPI 2021: 80 ML/MIN/1.73M2
GLUCOSE SERPL-MCNC: 89 MG/DL (ref 70–99)
HCO3 SERPL-SCNC: 26 MMOL/L (ref 22–29)
PHOSPHATE SERPL-MCNC: 3.1 MG/DL (ref 2.5–4.5)
POTASSIUM SERPL-SCNC: 4.6 MMOL/L (ref 3.4–5.3)
PSA FREE MFR SERPL: 34.48 %
PSA FREE SERPL-MCNC: 0.4 NG/ML
PSA SERPL DL<=0.01 NG/ML-MCNC: 1.16 NG/ML
SODIUM SERPL-SCNC: 135 MMOL/L (ref 135–145)

## 2025-06-30 ENCOUNTER — NURSE TRIAGE (OUTPATIENT)
Dept: FAMILY MEDICINE | Facility: CLINIC | Age: 85
End: 2025-06-30
Payer: MEDICARE

## 2025-06-30 RX ORDER — LISINOPRIL 20 MG/1
20 TABLET ORAL DAILY
COMMUNITY

## 2025-06-30 NOTE — TELEPHONE ENCOUNTER
S-(situation): Patient and spouse calling with concerns of rash.     B-(background):  OV with Dr. Taylor 6/25-Started on new medication, Lisinopril- hydrochlorothiazide 20-12.5 mg on 6/26. Rash developed yesterday.    A-(assessment): Flat red spots. Very itchy, on wrists, knees, elbows, and palms. Denies any swelling in lips face tongue, no difficulty breathing or swallowing. BP today was 128/76. Using benadryl cream on the rash areas, which soothes the itching.     R-(recommendations): Routing to provider to give recommendations. Patient did take the medication today.   Wife can be reached at [phone]       Additional Information   Negative: Difficulty breathing or wheezing   Negative: Hoarseness or cough that started soon after 1st dose of drug   Negative: Swollen tongue that started soon after first dose of drug   Negative: Fever and purple or blood-colored spots or dots   Negative: Too weak or sick to stand   Negative: Sounds like a life-threatening emergency to the triager   Negative: Rash is only on 1 part of the body (localized)   Negative: Taking new non-prescription (OTC) antihistamine, decongestant, ear drops, eye drops, or other OTC cough/cold medicine   Negative: Taking new prescription antihistamine, allergy medicine, asthma medicine, eye drops, ear drops or nose drops   Negative: Rash started more than 3 days after stopping new prescription medicine   Negative: Swollen tongue   Negative: Widespread hives and onset < 2 hours of exposure to 1st dose of drug   Negative: Patient sounds very sick or weak to the triager   Negative: Fever   Negative: Face or lip swelling   Negative: Purple or blood-colored spots or dots (no fever and sounds well to triager)   Negative: Joint pain or swelling   Negative: Bloody crusts on lips or in mouth   Negative: Large or small blisters on skin (i.e., fluid filled bubbles or sacs)   Negative: Pregnant   Negative: Rash beginning within 4 hours of a new prescription  medication   Negative: Hives or itching   Negative: Patient wants to be seen   Negative: Taking new prescription antibiotic (EXCEPTION: finished taking new prescription antibiotic)   Taking new prescription medicine (EXCEPTIONs: finished taking new prescription antibiotic OR questions about flushing from niacin)    Protocols used: Rash - Widespread on Drugs - Drug [id number]

## 2025-06-30 NOTE — TELEPHONE ENCOUNTER
Called Holly back (consent to communicate in chart) and relayed below message from Dr. Taylor as written.  Holly confirmed Michael still has lisinopril 20 mg tablets at home from his last fill 6/12/25 (90 day supply) and will restart this as directed.  Michael already has an appointment scheduled with Dr. Taylor on 7/21/25.  Will keep a BP log with date/time and bring this to his 7/21 appointment as well.      Will start Zyrtec 10 mg daily and Benadryl at night as directed.      Holly additionally states she forgot to mention Michael had also started another new medication, tamsulosin on 6/25/25, unsure if reaction is due to hydrochlorothiazide vs tamsulosin.  States she does recall he had taken tamsulosin in the past but does not recall why it was stopped, although doesn't think he tolerated it very well.      Only other symptom is that Holly reported Michael has been c/o more fatigue and feeling very tired during his walks.  Yesterday wasn't able to get through his usual 2 mile walk due to fatigue, which is not normally a problem.     Upon reviewed past medication hx, RN noted patient had been on tamsulosin back in 2021, and due to fatigue and poor endurance, it was discontinued 9/17/21 by Dr. Howard (please see OV notes from 9/17/25 for further details).  Of note, hydrochlorothiazide was also noted to have been stopped during same OV due to possible dehydration combined with hypotension.  No mention of rash for either medication.        Should we still add hydrochlorothiazide to allergy list?  Recommendations on tamsulosin?        Routing to Dr. Taylor for review/recommendations.        Jennifer Early RN  [facility]

## 2025-06-30 NOTE — TELEPHONE ENCOUNTER
Left message to call back for:  Holly  Information to relay to patient:  Please see below and relay upon return call.       ==========================================  Discussed in-person with Dr. Taylor - stop tamsulosin due to fatigue/poor endurance side effects as reported by patient.  Add hydrochlorothiazide to allergy list.  Follow up at scheduled appointment on 7/21/25.    RN updated medication list to reflect above changes.      Jennifer Early RN  [facility]

## 2025-06-30 NOTE — TELEPHONE ENCOUNTER
I would recommend he discontinue taking the new blood pressure medication and add hydrochlorothiazide to his allergy list.  He should go back to taking lisinopril 20 mg daily.  Does he have enough of that?  He can take Zyrtec 10 mg daily and Benadryl at night as needed for itching.  If that does not seem to help enough, he should call back by Wednesday or Thursday and I could consider adding prednisone.  He should follow-up with me in a couple of weeks so that we can reassess a plan for his blood pressure.

## 2025-07-19 ENCOUNTER — MEDICAL CORRESPONDENCE (OUTPATIENT)
Dept: HEALTH INFORMATION MANAGEMENT | Facility: CLINIC | Age: 85
End: 2025-07-19
Payer: MEDICARE

## 2025-07-21 ENCOUNTER — OFFICE VISIT (OUTPATIENT)
Dept: FAMILY MEDICINE | Facility: CLINIC | Age: 85
End: 2025-07-21
Payer: MEDICARE

## 2025-07-21 VITALS
HEIGHT: 66 IN | RESPIRATION RATE: 16 BRPM | TEMPERATURE: 97.6 F | SYSTOLIC BLOOD PRESSURE: 164 MMHG | DIASTOLIC BLOOD PRESSURE: 76 MMHG | OXYGEN SATURATION: 98 % | BODY MASS INDEX: 26.68 KG/M2 | HEART RATE: 52 BPM | WEIGHT: 166 LBS

## 2025-07-21 DIAGNOSIS — N40.1 BENIGN PROSTATIC HYPERPLASIA WITH LOWER URINARY TRACT SYMPTOMS, SYMPTOM DETAILS UNSPECIFIED: ICD-10-CM

## 2025-07-21 DIAGNOSIS — I10 ESSENTIAL HYPERTENSION: Primary | ICD-10-CM

## 2025-07-21 PROCEDURE — 99213 OFFICE O/P EST LOW 20 MIN: CPT | Performed by: FAMILY MEDICINE

## 2025-07-21 PROCEDURE — 3077F SYST BP >= 140 MM HG: CPT | Performed by: FAMILY MEDICINE

## 2025-07-21 PROCEDURE — G2211 COMPLEX E/M VISIT ADD ON: HCPCS | Performed by: FAMILY MEDICINE

## 2025-07-21 PROCEDURE — 3078F DIAST BP <80 MM HG: CPT | Performed by: FAMILY MEDICINE

## 2025-07-21 NOTE — PROGRESS NOTES
"  Assessment & Plan   Assessment & Plan  Essential hypertension  Patient blood pressure remains under suboptimal control on lisinopril 20 mg alone.  I prescribed Cardura XL 4 mg daily for his prostate and would like to see the effect this has on his blood pressure before making any further recommendations.  We did talk today about the possibility of adding amlodipine.  Orders:    doxazosin ER (CARDURA XL) 4 MG 24 hr tablet; Take 1 tablet (4 mg) by mouth daily (with breakfast).    Benign prostatic hyperplasia with lower urinary tract symptoms, symptom details unspecified  Trial of Cardura XL 4 mg daily since he possibly had an allergic reaction to Flomax.  Orders:    doxazosin ER (CARDURA XL) 4 MG 24 hr tablet; Take 1 tablet (4 mg) by mouth daily (with breakfast).    BMI  Estimated body mass index is 26.79 kg/m  as calculated from the following:    Height as of this encounter: 1.676 m (5' 6\").    Weight as of this encounter: 75.3 kg (166 lb).       Camelia Rodriguez is a 85 year old who presents today for follow-up regarding hypertension and benign prostatic hypertrophy.  At his last visit, I added hydrochlorothiazide and also prescribed Flomax and shortly after taking those new medications, he broke out in a rash in particular around his wrists and his knees.  His rash was very itchy.  He was directed to stop both new medications and he states that within 3 to 5 days his rash resolved.  The patient has been monitoring his blood pressures at home and brings in a list of blood pressure readings.  He continues to have frequent urination in particular at night waking up 3-4 times and producing only a small amount of urine.  1 month follow up and Hypertension      7/21/2025    10:55 AM   Additional Questions   Roomed by as   Accompanied by wife         7/21/2025    10:55 AM   Patient Reported Additional Medications   Patient reports taking the following new medications no           Objective    BP (!) 164/76 (BP Location: " "Left arm, Patient Position: Left side, Cuff Size: Adult Large)   Pulse 52   Temp 97.6  F (36.4  C) (Oral)   Resp 16   Ht 1.676 m (5' 6\")   Wt 75.3 kg (166 lb)   SpO2 98%   BMI 26.79 kg/m    Body mass index is 26.79 kg/m .  Physical Exam   GENERAL: alert and no distress    The longitudinal plan of care for the diagnosis(es)/condition(s) as documented were addressed during this visit. Due to the added complexity in care, I will continue to support Michael in the subsequent management and with ongoing continuity of care.        Signed Electronically by: MIKE SUMMERS MD    Answers submitted by the patient for this visit:  Hypertension Visit (Submitted on 7/17/2025)  Chief Complaint: Chronic problems general questions HPI Form  Do you check your blood pressure regularly outside of the clinic?: Yes  Are your blood pressures ever more than 140 on the top number (systolic) OR more than 90 on the bottom number (diastolic)? (For example, greater than 140/90): Yes  Are you following a low salt diet?: No  General Questionnaire (Submitted on 7/17/2025)  Chief Complaint: Chronic problems general questions HPI Form  How many servings of fruits and vegetables do you eat daily?: 2-3  On average, how many sweetened beverages do you drink each day (Examples: soda, juice, sweet tea, etc.  Do NOT count diet or artificially sweetened beverages)?: 0  How many minutes a day do you exercise enough to make your heart beat faster?: 9 or less  How many days a week do you exercise enough to make your heart beat faster?: 3 or less  How many days per week do you miss taking your medication?: 0  Questionnaire about: Chronic problems general questions HPI Form (Submitted on 7/17/2025)  Chief Complaint: Chronic problems general questions HPI Form    "

## 2025-07-21 NOTE — PROGRESS NOTES
"  {PROVIDER CHARTING PREFERENCE:547879}    Subjective   Michael is a 85 year old, presenting for the following health issues:  1 month follow up and Hypertension      7/21/2025    10:55 AM   Additional Questions   Roomed by as   Accompanied by wife         7/21/2025    10:55 AM   Patient Reported Additional Medications   Patient reports taking the following new medications no     History of Present Illness       Hypertension: He presents for follow up of hypertension.  He does check blood pressure  regularly outside of the clinic. Outside blood pressures have been over 140/90. He does not follow a low salt diet.     He eats 2-3 servings of fruits and vegetables daily.He consumes 0 sweetened beverage(s) daily.He exercises with enough effort to increase his heart rate 9 or less minutes per day.  He exercises with enough effort to increase his heart rate 3 or less days per week.   He is taking medications regularly.        {MA/LPN/RN Pre-Provider Visit Orders- hCG/UA/Strep (Optional):635338}  {SUPERLIST (Optional):296077}  {additonal problems for provider to add (Optional):488832}    {ROS Picklists (Optional):397478}      Objective    BP (!) 164/76 (BP Location: Left arm, Patient Position: Left side, Cuff Size: Adult Large)   Pulse 52   Temp 97.6  F (36.4  C) (Oral)   Resp 16   Ht 1.676 m (5' 6\")   Wt 75.3 kg (166 lb)   SpO2 98%   BMI 26.79 kg/m    Body mass index is 26.79 kg/m .  Physical Exam   {Exam List (Optional):927025}    {Diagnostic Test Results (Optional):413926}        Signed Electronically by: MIKE SUMMERS MD  {Email feedback regarding this note to [email or web address]   :[id number]}  "

## 2025-07-21 NOTE — ASSESSMENT & PLAN NOTE
Trial of Cardura XL 4 mg daily since he possibly had an allergic reaction to Flomax.  Orders:    doxazosin ER (CARDURA XL) 4 MG 24 hr tablet; Take 1 tablet (4 mg) by mouth daily (with breakfast).

## 2025-07-21 NOTE — ASSESSMENT & PLAN NOTE
Patient blood pressure remains under suboptimal control on lisinopril 20 mg alone.  I prescribed Cardura XL 4 mg daily for his prostate and would like to see the effect this has on his blood pressure before making any further recommendations.  We did talk today about the possibility of adding amlodipine.  Orders:    doxazosin ER (CARDURA XL) 4 MG 24 hr tablet; Take 1 tablet (4 mg) by mouth daily (with breakfast).

## 2025-07-23 ENCOUNTER — TELEPHONE (OUTPATIENT)
Dept: FAMILY MEDICINE | Facility: CLINIC | Age: 85
End: 2025-07-23
Payer: MEDICARE

## 2025-07-23 DIAGNOSIS — N40.1 BENIGN PROSTATIC HYPERPLASIA WITH LOWER URINARY TRACT SYMPTOMS, SYMPTOM DETAILS UNSPECIFIED: Primary | ICD-10-CM

## 2025-07-23 NOTE — TELEPHONE ENCOUNTER
General Call      Reason for Call:  med questions    What are your questions or concerns:  Patient has questions on cardura xl and would like a call back. Call spouse back     Date of last appointment with provider: n/a    Could we send this information to you in [email or web address] or would you prefer to receive a phone call?:   Patient would prefer a phone call   Okay to leave a detailed message?: Yes at Cell number on file:    Telephone Information:   Mobile [phone]

## 2025-07-30 RX ORDER — DOXAZOSIN 4 MG/1
4 TABLET ORAL AT BEDTIME
Qty: 30 TABLET | Refills: 1 | Status: SHIPPED | OUTPATIENT
Start: 2025-07-30

## 2025-07-30 NOTE — TELEPHONE ENCOUNTER
Spoke with patient's wife. Provider message relayed and patient scheduled for follow up visit 8/28/25.     Patient's wife would also like to update that his blood pressure taken at home was 133/66. Updated in patient-reported vitals.

## 2025-07-30 NOTE — TELEPHONE ENCOUNTER
S-(situation): Incoming call from patient's wife, following up regarding call from last week regarding the Doxazosin. Wife stated that Insurance company will not pay for that medication, it is like $230 dollar for the 30 day supply.     B-(background):     OV 7/30/25  Essential hypertension  Patient blood pressure remains under suboptimal control on lisinopril 20 mg alone.  I prescribed Cardura XL 4 mg daily for his prostate and would like to see the effect this has on his blood pressure before making any further recommendations.  We did talk today about the possibility of adding amlodipine.  Orders:    doxazosin ER (CARDURA XL) 4 MG 24 hr tablet; Take 1 tablet (4 mg) by mouth daily (with breakfast).    A-(assessment):     Wife calling in that patient's BP is still elevated. She is also wondering if Doxazosin Mesylate is the same medication. She found it on [employer] [facility] for cheaper.       R-(recommendations): I asked that she call insurance and inquire regarding this medication and why they are not covering it.     I attempted to call pharmacy- and had to leave a message. When pharmacy calls back- please ask them about the Doxazosin ER (Cardura XL). If a 90 day supply was sent in, would it be cheaper?      Francine YIN RN

## 2025-07-30 NOTE — TELEPHONE ENCOUNTER
Please communicate back to the patient's wife that I sent in doxazosin 4 mg to take once daily.  We could consider increasing it up to 8 mg in the future if need be for bladder control symptoms or to improve blood pressure.  Follow-up in a month.

## 2025-07-30 NOTE — TELEPHONE ENCOUNTER
[facility] returning call - insurance will cover doxazosin (non-ER/XL version) or terazosin, with insurance recommending 8 mg daily instead of the ER 4 mg tablet.  Per pharmacist, the doxazosin can have similar side effects to the tamsulosin and recommended patient start at 4 mg daily, and see how he tolerates the dose (of note, patient stopped tamsulosin on 6/30/25 due to fatigue/low energy, please see 6/30/25 Nurse Triage encounter for further details).      Please advise on new prescription request - Doxazosin 4 mg daily vs other medication.  Please also see additional RN note below for further information on BP elevation.          Jennifer Early, ELINA  [facility]